# Patient Record
Sex: MALE | Race: WHITE
[De-identification: names, ages, dates, MRNs, and addresses within clinical notes are randomized per-mention and may not be internally consistent; named-entity substitution may affect disease eponyms.]

---

## 2019-03-04 ENCOUNTER — HOSPITAL ENCOUNTER (INPATIENT)
Dept: HOSPITAL 96 - M.ERS | Age: 64
LOS: 1 days | Discharge: HOME | DRG: 700 | End: 2019-03-05
Attending: HOSPITALIST | Admitting: HOSPITALIST
Payer: COMMERCIAL

## 2019-03-04 VITALS — SYSTOLIC BLOOD PRESSURE: 191 MMHG | DIASTOLIC BLOOD PRESSURE: 98 MMHG

## 2019-03-04 VITALS — BODY MASS INDEX: 33.18 KG/M2 | WEIGHT: 224 LBS | HEIGHT: 69.02 IN

## 2019-03-04 VITALS — DIASTOLIC BLOOD PRESSURE: 104 MMHG | SYSTOLIC BLOOD PRESSURE: 186 MMHG

## 2019-03-04 VITALS — DIASTOLIC BLOOD PRESSURE: 86 MMHG | SYSTOLIC BLOOD PRESSURE: 161 MMHG

## 2019-03-04 DIAGNOSIS — Z90.49: ICD-10-CM

## 2019-03-04 DIAGNOSIS — F10.10: ICD-10-CM

## 2019-03-04 DIAGNOSIS — N40.0: ICD-10-CM

## 2019-03-04 DIAGNOSIS — Z85.46: ICD-10-CM

## 2019-03-04 DIAGNOSIS — N18.2: ICD-10-CM

## 2019-03-04 DIAGNOSIS — N28.89: Primary | ICD-10-CM

## 2019-03-04 DIAGNOSIS — Z82.49: ICD-10-CM

## 2019-03-04 DIAGNOSIS — I12.9: ICD-10-CM

## 2019-03-04 DIAGNOSIS — Z79.899: ICD-10-CM

## 2019-03-04 DIAGNOSIS — K74.60: ICD-10-CM

## 2019-03-04 LAB
ABSOLUTE BASOPHILS: 0.1 THOU/UL (ref 0–0.2)
ABSOLUTE EOSINOPHILS: 0.1 THOU/UL (ref 0–0.7)
ABSOLUTE MONOCYTES: 1.3 THOU/UL (ref 0–1.2)
ALBUMIN SERPL-MCNC: 3.4 G/DL (ref 3.4–5)
ALP SERPL-CCNC: 133 U/L (ref 46–116)
ALT SERPL-CCNC: 26 U/L (ref 30–65)
ANION GAP SERPL CALC-SCNC: 13 MMOL/L (ref 7–16)
APTT BLD: 20.5 SECONDS (ref 25–31.3)
AST SERPL-CCNC: 54 U/L (ref 15–37)
BACTERIA-REFLEX: (no result) /HPF
BASOPHILS NFR BLD AUTO: 0.7 %
BILIRUB SERPL-MCNC: 1.2 MG/DL
BILIRUB UR-MCNC: NEGATIVE MG/DL
BUN SERPL-MCNC: 15 MG/DL (ref 7–18)
CALCIUM SERPL-MCNC: 11.1 MG/DL (ref 8.5–10.1)
CHLORIDE SERPL-SCNC: 105 MMOL/L (ref 98–107)
CK-MB MASS: 0.9 NG/ML
CO2 SERPL-SCNC: 23 MMOL/L (ref 21–32)
COLOR UR: YELLOW
CREAT SERPL-MCNC: 1 MG/DL (ref 0.6–1.3)
EOSINOPHIL NFR BLD: 0.8 %
GLUCOSE SERPL-MCNC: 104 MG/DL (ref 70–99)
GRANULOCYTES NFR BLD MANUAL: 64.5 %
HCT VFR BLD CALC: 58.1 % (ref 42–52)
HGB BLD-MCNC: 19.6 GM/DL (ref 14–18)
INR PPP: 1.2
KETONES UR STRIP-MCNC: NEGATIVE MG/DL
LIPASE: 383 U/L (ref 73–393)
LYMPHOCYTES # BLD: 2.8 THOU/UL (ref 0.8–5.3)
LYMPHOCYTES NFR BLD AUTO: 23 %
MAGNESIUM SERPL-MCNC: 1.8 MG/DL (ref 1.8–2.4)
MCH RBC QN AUTO: 30.5 PG (ref 26–34)
MCHC RBC AUTO-ENTMCNC: 33.8 G/DL (ref 28–37)
MCV RBC: 90.1 FL (ref 80–100)
MONOCYTES NFR BLD: 11 %
MPV: 7.3 FL. (ref 7.2–11.1)
NEUTROPHILS # BLD: 7.8 THOU/UL (ref 1.6–8.1)
NT-PRO BRAIN NAT PEPTIDE: 681 PG/ML (ref ?–300)
NUCLEATED RBCS: 0 /100WBC
PLATELET COUNT*: 237 THOU/UL (ref 150–400)
POTASSIUM SERPL-SCNC: 3.7 MMOL/L (ref 3.5–5.1)
PROT SERPL-MCNC: 7.5 G/DL (ref 6.4–8.2)
PROT UR QL STRIP: NEGATIVE
PROTHROMBIN TIME: 12.4 SECONDS (ref 9.2–11.5)
RBC # BLD AUTO: 6.44 MIL/UL (ref 4.5–6)
RBC # UR STRIP: NEGATIVE /UL
RDW-CV: 15.9 % (ref 10.5–14.5)
SODIUM SERPL-SCNC: 141 MMOL/L (ref 136–145)
SP GR UR STRIP: 1.01 (ref 1–1.03)
SQUAMOUS: (no result) /LPF (ref 0–3)
THC: POSITIVE
TROPONIN-I LEVEL: <0.06 NG/ML (ref ?–0.06)
URINE CLARITY: CLEAR
URINE GLUCOSE-RANDOM: NEGATIVE
URINE LEUKOCYTES-REFLEX: (no result)
URINE NITRITE-REFLEX: NEGATIVE
URINE WBC-REFLEX: (no result) /HPF (ref 0–5)
UROBILINOGEN UR STRIP-ACNC: 0.2 E.U./DL (ref 0.2–1)
WBC # BLD AUTO: 12.1 THOU/UL (ref 4–11)

## 2019-03-04 NOTE — CON
[address]  [address]                    CONSULTATION                  
_______________________________________________________________________________
 
Name:       GEOVANNA ROMERO                   Room:           [address] [place]  
.R.#:  [id number]      Account #:      [id number]  
Admission:  19     Attend Phys:    Celia Pepper MD    
Discharge:  19     Date of Birth:  [birth year]  
         Report #: [id number]
                                                                     [id number]  
_______________________________________________________________________________
THIS REPORT FOR:  //name//                      
 
CC: Brian Pepper
 
TYPE OF REPORT:  Cardiology consultation. 
 
INDICATION:  Elevated troponin.
 
HISTORY OF PRESENT ILLNESS:  The patient is a 63-year-old gentleman who was
admitted with left flank pain.  He has a history of prostate cancer, hepatic
cirrhosis, renal mass and alcohol abuse.  During his hospitalization, troponins
were checked.  The initial troponin was less than 0.06, then 0.16, then 0.38,
then 0.38 and less than 0.06, then 0.40 and then 0.26.  Throughout this, he
denied any chest pain.  He has no cardiac history.  Cardiac evaluation
consisting of noninvasive evaluation was deferred by the patient.  A 12-lead EKG
showed sinus rhythm without acute ST or T-wave abnormalities.
 
PAST MEDICAL HISTORY:
1.  Prostate cancer.
2.  Left renal mass.
3.  History of hypertension.
4.  History of cirrhosis.
5.  History of colectomy.
6.  Alcohol abuse.
 
HOME MEDICATIONS:  Quinapril 40 mg daily, Flomax 0.4 mg daily, tramadol 50 mg q.
6 hours p.r.n. and Xifaxan 550 mg daily.
 
ALLERGIES:  None documented.
 
FAMILY HISTORY:  The patient's mother had heart disease in her late 50s or early
60s.  The patient's father  at young age, his relatives did not have heart
disease.
 
SOCIAL HISTORY:  The patient is a lifelong nonsmoker, drinks alcohol daily.  He
is .  He lives with his wife.
 
REVIEW OF SYSTEMS:  Positive for shortness of breath and cough.  Otherwise,
unremarkable.
 
PHYSICAL EXAMINATION:
VITAL SIGNS:  Stable.  Blood pressure 104/63 and pulse 88 and regular.
GENERAL:  This is a pleasant gentleman who is in no distress.
HEENT:  The patient is wearing glasses.  Extraocular muscles intact.  Mucous
membranes moist.
 
 
 
[place] [address]                    CONSULTATION                  
_______________________________________________________________________________
 
Name:       GEOVANNA ROMERO                   Room:           M.227-1    DIS [place]  
M.R.#:  [id number]      Account #:      [id number]  
Admission:  19     Attend Phys:    Celia Pepper MD    
Discharge:  19     Date of Birth:  [birth year]  
         Report #: [id number]
                                                                     [id number]  
_______________________________________________________________________________
NECK:  Shows no jugular venous distention.  There are no carotid bruits.
CHEST:  Reveals clear lung fields.
CARDIOVASCULAR:  Reveals a regular rhythm.  Normal S1 and S2.  I do not
appreciate gallop or murmur.
ABDOMEN:  Reveals normal bowel sounds.  The abdomen is soft and nontender.
EXTREMITIES:  Shows no edema.  Peripheral pulses 2+ and palpable.
SKIN:  Warm and dry.
 
IMPRESSION AND RECOMMENDATIONS:
1.  Elevated troponins, likely a chronic issue.  There is no rhyme or reason to
the progression of his troponins during this hospitalization.  He is not having
any symptoms to suggest acute coronary syndrome.  I would like to follow up with
the patient as an outpatient in 2 weeks to see whether or not further stress
testing is warranted.
2.  Hypertension, presently stable.
3.  Prostate cancer and renal mass being followed by outside Oncology.
 
At this point in time, I believe the patient could be safely discharged home to
follow up as an outpatient.
 
 
 
 
 
 
 
 
 
 
 
 
 
 
 
 
 
 
 
 
 
 
 
 
 
                       
                                        By:                                
                 
D: 19 1446_______________________________________
T: 19 0840Lalo Vaughan MD, FACC      /nt

## 2019-03-05 VITALS — SYSTOLIC BLOOD PRESSURE: 132 MMHG | DIASTOLIC BLOOD PRESSURE: 84 MMHG

## 2019-03-05 VITALS — SYSTOLIC BLOOD PRESSURE: 115 MMHG | DIASTOLIC BLOOD PRESSURE: 59 MMHG

## 2019-03-05 VITALS — DIASTOLIC BLOOD PRESSURE: 63 MMHG | SYSTOLIC BLOOD PRESSURE: 104 MMHG

## 2019-03-05 VITALS — SYSTOLIC BLOOD PRESSURE: 156 MMHG | DIASTOLIC BLOOD PRESSURE: 84 MMHG

## 2019-03-05 LAB
ABSOLUTE BASOPHILS: 0.1 THOU/UL (ref 0–0.2)
ABSOLUTE EOSINOPHILS: 0 THOU/UL (ref 0–0.7)
ABSOLUTE MONOCYTES: 1.6 THOU/UL (ref 0–1.2)
AMMONIA PLAS-SCNC: 30 UMOL/L (ref 11–32)
ANION GAP SERPL CALC-SCNC: 14 MMOL/L (ref 7–16)
BASOPHILS NFR BLD AUTO: 0.3 %
BUN SERPL-MCNC: 13 MG/DL (ref 7–18)
CALCIUM SERPL-MCNC: 9.6 MG/DL (ref 8.5–10.1)
CHLORIDE SERPL-SCNC: 103 MMOL/L (ref 98–107)
CO2 SERPL-SCNC: 21 MMOL/L (ref 21–32)
CREAT SERPL-MCNC: 0.7 MG/DL (ref 0.6–1.3)
EOSINOPHIL NFR BLD: 0.1 %
GLUCOSE SERPL-MCNC: 120 MG/DL (ref 70–99)
GRANULOCYTES NFR BLD MANUAL: 79.8 %
HCT VFR BLD CALC: 54.7 % (ref 42–52)
HGB BLD-MCNC: 18.4 GM/DL (ref 14–18)
LYMPHOCYTES # BLD: 1.7 THOU/UL (ref 0.8–5.3)
LYMPHOCYTES NFR BLD AUTO: 10 %
MAGNESIUM SERPL-MCNC: 1.5 MG/DL (ref 1.8–2.4)
MCH RBC QN AUTO: 30.4 PG (ref 26–34)
MCHC RBC AUTO-ENTMCNC: 33.6 G/DL (ref 28–37)
MCV RBC: 90.4 FL (ref 80–100)
MONOCYTES NFR BLD: 9.8 %
MPV: 7.4 FL. (ref 7.2–11.1)
NEUTROPHILS # BLD: 13.4 THOU/UL (ref 1.6–8.1)
NUCLEATED RBCS: 0 /100WBC
PHOSPHATE SERPL-MCNC: 3.8 MG/DL (ref 2.5–4.9)
PLATELET COUNT*: 221 THOU/UL (ref 150–400)
POTASSIUM SERPL-SCNC: 3.2 MMOL/L (ref 3.5–5.1)
RBC # BLD AUTO: 6.05 MIL/UL (ref 4.5–6)
RDW-CV: 15.8 % (ref 10.5–14.5)
SODIUM SERPL-SCNC: 138 MMOL/L (ref 136–145)
TROPONIN-I LEVEL: 0.38 NG/ML (ref ?–0.06)
WBC # BLD AUTO: 16.8 THOU/UL (ref 4–11)

## 2019-03-05 NOTE — EKG
[place] [address]
Phone:  [phone]                     ELECTROCARDIOGRAM REPORT      
_______________________________________________________________________________
 
Name:       GEOVANNA ROMERO                   Room:           [place] [address]    ADM [place]  
.R.#:  [id number]      Account #:      [id number]  
Admission:  19     Attend Phys:    Celia Pepper MD    
Discharge:               Date of Birth:  [birth year]  
         Report #: [id number]
    [id number]
_______________________________________________________________________________
THIS REPORT FOR:  //name//                      
 
                         [facility] ED
                                       
Test Date:    2019               Test Time:    17:43:27
Pat Name:     GEOVANNA ROMERO               Department:   
Patient ID:   [id number]            Room:         [facility]
Gender:                               Technician:   FLOYD
:          1955               Requested By: Kole Ybarra
Order Number: [id number] MD:   Taurus Garibay
                                 Measurements
Intervals                              Axis          
Rate:         82                       P:            36
ID:           [id number]                      QRS:          6
QRSD:         82                       T:            -4
QT:           341                                    
QTc:          399                                    
                           Interpretive Statements
Sinus rhythm
Paired ventricular premature complexes
Borderline repolarization abnormality
Compared to ECG 2015 17:02:42
Ventricular premature complex(es) now present
 
Electronically Signed On 3-5-2019 12:53:00 CST by Taurus Garibay
[email or web address]
 
 
 
 
 
 
 
 
 
 
 
 
 
 
 
 
 
  <ELECTRONICALLY SIGNED>
                                           By: Taurus Garibay MD, [facility]     
  19     1253
D: 19 1743   _____________________________________
T: 19 1743   Taurus Garibay MD, [facility]       /EPI

## 2019-03-05 NOTE — EKG
[place] [address]
Phone:  [phone]                     ELECTROCARDIOGRAM REPORT      
_______________________________________________________________________________
 
Name:       GEOVANNA ROMERO                   Room:           [place] [address]    ADM [place]  
.R.#:  [id number]      Account #:      [id number]  
Admission:  19     Attend Phys:    Celia Pepper MD    
Discharge:               Date of Birth:  [birth year]  
         Report #: [id number]
    [id number]
_______________________________________________________________________________
THIS REPORT FOR:  //name//                      
 
                          [facility]
                                       
Test Date:    2019               Test Time:    02:25:37
Pat Name:     GEOVANNA ROMERO               Department:   
Patient ID:   [id number]            Room:         [place] [address]
Gender:       M                        Technician:   
:          1955               Requested By: Celia Pepper
Order Number: [id number]    Reading MD:   Taurus Garibay
                                 Measurements
Intervals                              Axis          
Rate:         96                       P:            56
OR:           169                      QRS:          13
QRSD:         82                       T:            12
QT:           365                                    
QTc:          462                                    
                           Interpretive Statements
Sinus rhythm
Left atrial enlargement
Borderline low voltage, extremity leads
Probable anteroseptal infarct, old
Baseline wander in lead(s) V1,V2
Compared to ECG 2015 17:02:42
Atrial abnormality now present
Myocardial infarct finding now present
T-wave abnormality no longer present
 
Electronically Signed On 3-5-2019 12:56:08 CST by Taurus Garibay
[email or web address]
 
 
 
 
 
 
 
 
 
 
 
 
 
  <ELECTRONICALLY SIGNED>
                                           By: Taurus Garibay MD, [facility]     
  19     1256
D: 195   _____________________________________
T: 19 0225   Taurus Garibay MD, FAC       /EPI

## 2019-03-05 NOTE — NUR
UPON ATTEMPTING TO COMPLETE OT EVALUATION, PT STATES HE DOES NOT WANT NOR
REQUIRE OT SERVICES AS HE IS ABLE TO COMPLETE ADLS AND AMBULATION WITH NO
ASSIST. PER PT REQUEST DISCHARGE OT EVAL

## 2019-03-05 NOTE — 2DMMODE
[place] [address]
Phone:  [phone] 2 D/M-MODE ECHOCARDIOGRAM     
_______________________________________________________________________________
 
Name:         GEOVANNA ROMERO                  Room:          [facility]    [facility] [place] 
[facility]#:    [id number]     Account #:     [id number]  
Admission:    19    Attend Phys:   Celia Pepper MD 
Discharge:                Date of Birth: [birth year]  
Date of Service: 19 1633  Report #:      [id number]
        [id number]
_______________________________________________________________________________
THIS REPORT FOR:  //name//                      
 
 
--------------- APPROVED REPORT --------------
 
 
Study performed:  2019 13:56:00
 
EXAM: Comprehensive 2D, Doppler, and color-flow 
Echocardiogram 
Patient Location: In-Patient   
Room #:  [facility]     Status:  routine
 
      BSA:         2.17
HR: 83 bpm BP:          104/63 mmHg 
Rhythm: NSR     
 
Other Information 
Study Quality: Good
 
Indications
Acute MI 
Dyspnea 
Chest Pain
 
2D Dimensions
IVSd:  11.66 (7-11mm) LVOT Diam:  20.22 (18-24mm) 
LVDd:  49.98 mm  
PWd:  13.60 (7-11mm) Ascending Ao:  35.67 (22-36mm)
LVDs:  24.24 (25-40mm) 
Aortic Root:  32.28 mm 
 
Volumes
Left Atrial Volume (Systole) 
    LA ESV Index:  36.90 mL/m2
 
Aortic Valve
AoV Peak Ramin.:  1.52 m/s 
AO Peak Gr.:  9.19 mmHg  LVOT Max P.65 mmHg
AO Mean Gr.:  4.86 mmHg  LVOT Mean PG:  3.60 mmHg
    LVOT Max V:  1.29 m/s
AO V2 VTI:  26.31 cm  LVOT Mean V:  0.88 m/s
GILMER (VTI):  3.29 cm2  LVOT V1 VTI:  26.98 cm
 
Mitral Valve
    E/A Ratio:  1.04
 
 
[place] [address]
Phone:  [phone]                     2 D/M-MODE ECHOCARDIOGRAM     
_______________________________________________________________________________
 
Name:         GEOVANNA ROMERO                  Room:          [address] [place] 
.R.#:    [id number]     Account #:     [id number]  
Admission:    19    Attend Phys:   Celia Pepper MD 
Discharge:                Date of Birth: [birth year]  
Date of Service: 19 1633  Report #:      [id number]
        [id number]
_______________________________________________________________________________
    MV Decel. Time:  302.50 ms
MV E Max Ramin.:  0.64 m/s 
MV PHT:  87.73 ms  
MVA (PHT):  2.51 cm2  
 
TDI
E/Lateral E':  4.92 E/Medial E':  9.14
   Medial E' Ramin.:  0.07 m/s
   Lateral E' Ramin.:  0.13 m/s
 
Pulmonary Valve
PV Peak Ramin.:  1.03 m/s PV Peak Gr.:  4.27 mmHg
 
Left Ventricle
The left ventricle is normal size. There is normal LV segmental wall 
motion. There is normal left ventricular wall thickness. Left 
ventricular systolic function is normal. The left ventricular 
ejection fraction is within the normal range. LVEF is 65%. The left 
ventricular diastolic function is normal.
 
Right Ventricle
The right ventricle is normal size. The right ventricular systolic 
function is normal.
 
Atria
Left atrium is mildly dilated. The right atrium size is 
normal.
 
Aortic Valve
The aortic valve is normal in structure. No aortic regurgitation is 
present. There is no aortic valvular stenosis.
 
Mitral Valve
The mitral valve is normal in structure. There is no mitral valve 
regurgitation noted. No evidence of mitral valve stenosis.
 
Tricuspid Valve
The tricuspid valve is normal in structure. Trace tricuspid 
regurgitation. Unable to assess PA pressure.
 
Pulmonic Valve
The pulmonary valve is normal in structure. There is no pulmonic 
valvular regurgitation.
 
Great Vessels
The aortic root is normal in size. IVC is normal in size and 
 
 
[place] [address]
Phone:  [phone]                     2 D/M-MODE ECHOCARDIOGRAM     
_______________________________________________________________________________
 
Name:         GEOVANNA ROMERO                  Room:          [address] [place] 
[facility]#:    [id number]     Account #:     [id number]  
Admission:    19    Attend Phys:   Celia Pepper MD 
Discharge:                Date of Birth: [birth year]  
Date of Service: 19 1633  Report #:      [id number]
        [id number]
_______________________________________________________________________________
collapses >50% with inspiration.
 
Pericardium
There is no pericardial effusion.
 
<Conclusion>
The left ventricle is normal size.
There is normal left ventricular wall thickness.
Left ventricular systolic function is normal.
The left ventricular ejection fraction is within the normal 
range.
LVEF is 65%.
The right ventricle is normal size.
Left atrium is mildly dilated.
The aortic valve is normal in structure.
The mitral valve is normal in structure.
The tricuspid valve is normal in structure.
IVC is normal in size and collapses >50% with inspiration.
There is no pericardial effusion.
There is normal LV segmental wall motion.
 
 
 
 
 
 
 
 
 
 
 
 
 
 
 
 
 
 
 
 
 
 
 
 
  <ELECTRONICALLY SIGNED>
                                           By: Taurus Garibay MD, [facility]     
  19     1633
D: 19 1633   _____________________________________
T: 19 1633   Taurus Garibay MD, FACC       /INF

## 2019-03-05 NOTE — NUR
RECEIVED REPORT AND ASSUMED CARE AT 2105. PT TRANSPORTED FROM ED TO ROOM 227.
BP ELEVATED, PT REPORTS PAIN IN BACK. OTHERWISE VSS. PRN MEDICATION ADMIN PER
ORDERS FOR BP AND PAIN. PT ORIENTATED TO ROOM, CALL LIGHT, FALL POLICY.
ASSESSMENT COMPLETED AS CHARTED. ADMISSION COMPLETED BY NURSING. PT UP SBA ON
2LNC PRN. BED LOCKED IN LOWEST POSITION, CALL LIGHT WITHIN REACH

## 2020-05-16 ENCOUNTER — HOSPITAL ENCOUNTER (INPATIENT)
Dept: HOSPITAL 96 - M.ERS | Age: 65
LOS: 6 days | DRG: 870 | End: 2020-05-22
Attending: INTERNAL MEDICINE | Admitting: INTERNAL MEDICINE
Payer: COMMERCIAL

## 2020-05-16 VITALS — SYSTOLIC BLOOD PRESSURE: 85 MMHG | DIASTOLIC BLOOD PRESSURE: 39 MMHG

## 2020-05-16 VITALS — DIASTOLIC BLOOD PRESSURE: 40 MMHG | SYSTOLIC BLOOD PRESSURE: 99 MMHG

## 2020-05-16 VITALS — SYSTOLIC BLOOD PRESSURE: 92 MMHG | DIASTOLIC BLOOD PRESSURE: 34 MMHG

## 2020-05-16 VITALS — DIASTOLIC BLOOD PRESSURE: 40 MMHG | SYSTOLIC BLOOD PRESSURE: 87 MMHG

## 2020-05-16 VITALS — DIASTOLIC BLOOD PRESSURE: 36 MMHG | SYSTOLIC BLOOD PRESSURE: 98 MMHG

## 2020-05-16 VITALS — SYSTOLIC BLOOD PRESSURE: 102 MMHG | DIASTOLIC BLOOD PRESSURE: 37 MMHG

## 2020-05-16 VITALS — DIASTOLIC BLOOD PRESSURE: 40 MMHG | SYSTOLIC BLOOD PRESSURE: 109 MMHG

## 2020-05-16 VITALS — SYSTOLIC BLOOD PRESSURE: 96 MMHG | DIASTOLIC BLOOD PRESSURE: 42 MMHG

## 2020-05-16 VITALS — SYSTOLIC BLOOD PRESSURE: 98 MMHG | DIASTOLIC BLOOD PRESSURE: 42 MMHG

## 2020-05-16 VITALS — SYSTOLIC BLOOD PRESSURE: 94 MMHG | DIASTOLIC BLOOD PRESSURE: 40 MMHG

## 2020-05-16 VITALS — DIASTOLIC BLOOD PRESSURE: 36 MMHG | SYSTOLIC BLOOD PRESSURE: 89 MMHG

## 2020-05-16 VITALS — DIASTOLIC BLOOD PRESSURE: 43 MMHG | SYSTOLIC BLOOD PRESSURE: 92 MMHG

## 2020-05-16 VITALS — DIASTOLIC BLOOD PRESSURE: 59 MMHG | SYSTOLIC BLOOD PRESSURE: 114 MMHG

## 2020-05-16 VITALS — DIASTOLIC BLOOD PRESSURE: 67 MMHG | SYSTOLIC BLOOD PRESSURE: 105 MMHG

## 2020-05-16 VITALS — SYSTOLIC BLOOD PRESSURE: 120 MMHG | DIASTOLIC BLOOD PRESSURE: 59 MMHG

## 2020-05-16 VITALS — SYSTOLIC BLOOD PRESSURE: 121 MMHG | DIASTOLIC BLOOD PRESSURE: 53 MMHG

## 2020-05-16 VITALS — DIASTOLIC BLOOD PRESSURE: 54 MMHG | SYSTOLIC BLOOD PRESSURE: 151 MMHG

## 2020-05-16 VITALS — DIASTOLIC BLOOD PRESSURE: 33 MMHG | SYSTOLIC BLOOD PRESSURE: 94 MMHG

## 2020-05-16 VITALS — DIASTOLIC BLOOD PRESSURE: 36 MMHG | SYSTOLIC BLOOD PRESSURE: 100 MMHG

## 2020-05-16 VITALS — DIASTOLIC BLOOD PRESSURE: 40 MMHG | SYSTOLIC BLOOD PRESSURE: 107 MMHG

## 2020-05-16 VITALS — DIASTOLIC BLOOD PRESSURE: 37 MMHG | SYSTOLIC BLOOD PRESSURE: 97 MMHG

## 2020-05-16 VITALS — SYSTOLIC BLOOD PRESSURE: 105 MMHG | DIASTOLIC BLOOD PRESSURE: 48 MMHG

## 2020-05-16 VITALS — DIASTOLIC BLOOD PRESSURE: 56 MMHG | SYSTOLIC BLOOD PRESSURE: 149 MMHG

## 2020-05-16 VITALS — SYSTOLIC BLOOD PRESSURE: 93 MMHG | DIASTOLIC BLOOD PRESSURE: 36 MMHG

## 2020-05-16 VITALS — SYSTOLIC BLOOD PRESSURE: 111 MMHG | DIASTOLIC BLOOD PRESSURE: 48 MMHG

## 2020-05-16 VITALS — SYSTOLIC BLOOD PRESSURE: 95 MMHG | DIASTOLIC BLOOD PRESSURE: 47 MMHG

## 2020-05-16 VITALS — DIASTOLIC BLOOD PRESSURE: 55 MMHG | SYSTOLIC BLOOD PRESSURE: 122 MMHG

## 2020-05-16 VITALS — DIASTOLIC BLOOD PRESSURE: 44 MMHG | SYSTOLIC BLOOD PRESSURE: 107 MMHG

## 2020-05-16 VITALS — DIASTOLIC BLOOD PRESSURE: 33 MMHG | SYSTOLIC BLOOD PRESSURE: 93 MMHG

## 2020-05-16 VITALS — SYSTOLIC BLOOD PRESSURE: 90 MMHG | DIASTOLIC BLOOD PRESSURE: 34 MMHG

## 2020-05-16 VITALS — SYSTOLIC BLOOD PRESSURE: 93 MMHG | DIASTOLIC BLOOD PRESSURE: 41 MMHG

## 2020-05-16 VITALS — SYSTOLIC BLOOD PRESSURE: 89 MMHG | DIASTOLIC BLOOD PRESSURE: 41 MMHG

## 2020-05-16 VITALS — BODY MASS INDEX: 39.51 KG/M2 | HEIGHT: 70.98 IN | WEIGHT: 282.19 LBS

## 2020-05-16 VITALS — DIASTOLIC BLOOD PRESSURE: 34 MMHG | SYSTOLIC BLOOD PRESSURE: 90 MMHG

## 2020-05-16 VITALS — DIASTOLIC BLOOD PRESSURE: 71 MMHG | SYSTOLIC BLOOD PRESSURE: 136 MMHG

## 2020-05-16 VITALS — SYSTOLIC BLOOD PRESSURE: 139 MMHG | DIASTOLIC BLOOD PRESSURE: 52 MMHG

## 2020-05-16 VITALS — SYSTOLIC BLOOD PRESSURE: 128 MMHG | DIASTOLIC BLOOD PRESSURE: 65 MMHG

## 2020-05-16 VITALS — DIASTOLIC BLOOD PRESSURE: 45 MMHG | SYSTOLIC BLOOD PRESSURE: 105 MMHG

## 2020-05-16 VITALS — SYSTOLIC BLOOD PRESSURE: 105 MMHG | DIASTOLIC BLOOD PRESSURE: 27 MMHG

## 2020-05-16 VITALS — DIASTOLIC BLOOD PRESSURE: 33 MMHG | SYSTOLIC BLOOD PRESSURE: 95 MMHG

## 2020-05-16 VITALS — DIASTOLIC BLOOD PRESSURE: 47 MMHG | SYSTOLIC BLOOD PRESSURE: 115 MMHG

## 2020-05-16 VITALS — SYSTOLIC BLOOD PRESSURE: 88 MMHG | DIASTOLIC BLOOD PRESSURE: 31 MMHG

## 2020-05-16 VITALS — DIASTOLIC BLOOD PRESSURE: 63 MMHG | SYSTOLIC BLOOD PRESSURE: 137 MMHG

## 2020-05-16 VITALS — DIASTOLIC BLOOD PRESSURE: 63 MMHG | SYSTOLIC BLOOD PRESSURE: 144 MMHG

## 2020-05-16 VITALS — SYSTOLIC BLOOD PRESSURE: 99 MMHG | DIASTOLIC BLOOD PRESSURE: 45 MMHG

## 2020-05-16 VITALS — DIASTOLIC BLOOD PRESSURE: 33 MMHG | SYSTOLIC BLOOD PRESSURE: 99 MMHG

## 2020-05-16 VITALS — SYSTOLIC BLOOD PRESSURE: 111 MMHG | DIASTOLIC BLOOD PRESSURE: 58 MMHG

## 2020-05-16 VITALS — DIASTOLIC BLOOD PRESSURE: 40 MMHG | SYSTOLIC BLOOD PRESSURE: 95 MMHG

## 2020-05-16 VITALS — DIASTOLIC BLOOD PRESSURE: 47 MMHG | SYSTOLIC BLOOD PRESSURE: 97 MMHG

## 2020-05-16 VITALS — SYSTOLIC BLOOD PRESSURE: 100 MMHG | DIASTOLIC BLOOD PRESSURE: 56 MMHG

## 2020-05-16 VITALS — SYSTOLIC BLOOD PRESSURE: 97 MMHG | DIASTOLIC BLOOD PRESSURE: 43 MMHG

## 2020-05-16 VITALS — SYSTOLIC BLOOD PRESSURE: 95 MMHG | DIASTOLIC BLOOD PRESSURE: 31 MMHG

## 2020-05-16 VITALS — DIASTOLIC BLOOD PRESSURE: 41 MMHG | SYSTOLIC BLOOD PRESSURE: 88 MMHG

## 2020-05-16 VITALS — DIASTOLIC BLOOD PRESSURE: 32 MMHG | SYSTOLIC BLOOD PRESSURE: 90 MMHG

## 2020-05-16 VITALS — DIASTOLIC BLOOD PRESSURE: 57 MMHG | SYSTOLIC BLOOD PRESSURE: 90 MMHG

## 2020-05-16 VITALS — SYSTOLIC BLOOD PRESSURE: 139 MMHG | DIASTOLIC BLOOD PRESSURE: 62 MMHG

## 2020-05-16 VITALS — DIASTOLIC BLOOD PRESSURE: 47 MMHG | SYSTOLIC BLOOD PRESSURE: 116 MMHG

## 2020-05-16 VITALS — SYSTOLIC BLOOD PRESSURE: 112 MMHG | DIASTOLIC BLOOD PRESSURE: 64 MMHG

## 2020-05-16 VITALS — DIASTOLIC BLOOD PRESSURE: 71 MMHG | SYSTOLIC BLOOD PRESSURE: 104 MMHG

## 2020-05-16 VITALS — SYSTOLIC BLOOD PRESSURE: 96 MMHG | DIASTOLIC BLOOD PRESSURE: 39 MMHG

## 2020-05-16 VITALS — DIASTOLIC BLOOD PRESSURE: 42 MMHG | SYSTOLIC BLOOD PRESSURE: 84 MMHG

## 2020-05-16 VITALS — DIASTOLIC BLOOD PRESSURE: 44 MMHG | SYSTOLIC BLOOD PRESSURE: 88 MMHG

## 2020-05-16 VITALS — SYSTOLIC BLOOD PRESSURE: 98 MMHG | DIASTOLIC BLOOD PRESSURE: 41 MMHG

## 2020-05-16 VITALS — SYSTOLIC BLOOD PRESSURE: 89 MMHG | DIASTOLIC BLOOD PRESSURE: 40 MMHG

## 2020-05-16 VITALS — SYSTOLIC BLOOD PRESSURE: 97 MMHG | DIASTOLIC BLOOD PRESSURE: 56 MMHG

## 2020-05-16 VITALS — SYSTOLIC BLOOD PRESSURE: 79 MMHG | DIASTOLIC BLOOD PRESSURE: 52 MMHG

## 2020-05-16 VITALS — SYSTOLIC BLOOD PRESSURE: 102 MMHG | DIASTOLIC BLOOD PRESSURE: 47 MMHG

## 2020-05-16 VITALS — SYSTOLIC BLOOD PRESSURE: 97 MMHG | DIASTOLIC BLOOD PRESSURE: 49 MMHG

## 2020-05-16 VITALS — SYSTOLIC BLOOD PRESSURE: 85 MMHG | DIASTOLIC BLOOD PRESSURE: 37 MMHG

## 2020-05-16 VITALS — SYSTOLIC BLOOD PRESSURE: 104 MMHG | DIASTOLIC BLOOD PRESSURE: 39 MMHG

## 2020-05-16 VITALS — DIASTOLIC BLOOD PRESSURE: 39 MMHG | SYSTOLIC BLOOD PRESSURE: 86 MMHG

## 2020-05-16 VITALS — SYSTOLIC BLOOD PRESSURE: 118 MMHG | DIASTOLIC BLOOD PRESSURE: 55 MMHG

## 2020-05-16 VITALS — SYSTOLIC BLOOD PRESSURE: 110 MMHG | DIASTOLIC BLOOD PRESSURE: 65 MMHG

## 2020-05-16 VITALS — DIASTOLIC BLOOD PRESSURE: 54 MMHG | SYSTOLIC BLOOD PRESSURE: 99 MMHG

## 2020-05-16 VITALS — DIASTOLIC BLOOD PRESSURE: 44 MMHG | SYSTOLIC BLOOD PRESSURE: 94 MMHG

## 2020-05-16 VITALS — SYSTOLIC BLOOD PRESSURE: 144 MMHG | DIASTOLIC BLOOD PRESSURE: 58 MMHG

## 2020-05-16 VITALS — SYSTOLIC BLOOD PRESSURE: 93 MMHG | DIASTOLIC BLOOD PRESSURE: 42 MMHG

## 2020-05-16 VITALS — SYSTOLIC BLOOD PRESSURE: 89 MMHG | DIASTOLIC BLOOD PRESSURE: 36 MMHG

## 2020-05-16 VITALS — DIASTOLIC BLOOD PRESSURE: 34 MMHG | SYSTOLIC BLOOD PRESSURE: 55 MMHG

## 2020-05-16 VITALS — SYSTOLIC BLOOD PRESSURE: 140 MMHG | DIASTOLIC BLOOD PRESSURE: 55 MMHG

## 2020-05-16 VITALS — DIASTOLIC BLOOD PRESSURE: 40 MMHG | SYSTOLIC BLOOD PRESSURE: 98 MMHG

## 2020-05-16 VITALS — SYSTOLIC BLOOD PRESSURE: 102 MMHG | DIASTOLIC BLOOD PRESSURE: 38 MMHG

## 2020-05-16 VITALS — SYSTOLIC BLOOD PRESSURE: 90 MMHG | DIASTOLIC BLOOD PRESSURE: 38 MMHG

## 2020-05-16 VITALS — DIASTOLIC BLOOD PRESSURE: 65 MMHG | SYSTOLIC BLOOD PRESSURE: 101 MMHG

## 2020-05-16 VITALS — DIASTOLIC BLOOD PRESSURE: 46 MMHG | SYSTOLIC BLOOD PRESSURE: 108 MMHG

## 2020-05-16 VITALS — DIASTOLIC BLOOD PRESSURE: 35 MMHG | SYSTOLIC BLOOD PRESSURE: 95 MMHG

## 2020-05-16 VITALS — SYSTOLIC BLOOD PRESSURE: 94 MMHG | DIASTOLIC BLOOD PRESSURE: 46 MMHG

## 2020-05-16 VITALS — SYSTOLIC BLOOD PRESSURE: 95 MMHG | DIASTOLIC BLOOD PRESSURE: 40 MMHG

## 2020-05-16 VITALS — SYSTOLIC BLOOD PRESSURE: 89 MMHG | DIASTOLIC BLOOD PRESSURE: 37 MMHG

## 2020-05-16 VITALS — DIASTOLIC BLOOD PRESSURE: 40 MMHG | SYSTOLIC BLOOD PRESSURE: 96 MMHG

## 2020-05-16 DIAGNOSIS — D69.6: ICD-10-CM

## 2020-05-16 DIAGNOSIS — E87.1: ICD-10-CM

## 2020-05-16 DIAGNOSIS — I50.9: ICD-10-CM

## 2020-05-16 DIAGNOSIS — I21.3: ICD-10-CM

## 2020-05-16 DIAGNOSIS — G93.41: ICD-10-CM

## 2020-05-16 DIAGNOSIS — K72.00: ICD-10-CM

## 2020-05-16 DIAGNOSIS — K55.9: ICD-10-CM

## 2020-05-16 DIAGNOSIS — K21.9: ICD-10-CM

## 2020-05-16 DIAGNOSIS — N28.89: ICD-10-CM

## 2020-05-16 DIAGNOSIS — A41.2: Primary | ICD-10-CM

## 2020-05-16 DIAGNOSIS — R65.21: ICD-10-CM

## 2020-05-16 DIAGNOSIS — J93.9: ICD-10-CM

## 2020-05-16 DIAGNOSIS — Z79.82: ICD-10-CM

## 2020-05-16 DIAGNOSIS — Z85.46: ICD-10-CM

## 2020-05-16 DIAGNOSIS — F10.239: ICD-10-CM

## 2020-05-16 DIAGNOSIS — K74.60: ICD-10-CM
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
## 2020-05-16 DIAGNOSIS — K76.6: ICD-10-CM

## 2020-05-16 DIAGNOSIS — L89.309: ICD-10-CM

## 2020-05-16 DIAGNOSIS — K56.7: ICD-10-CM

## 2020-05-16 DIAGNOSIS — Z66: ICD-10-CM

## 2020-05-16 DIAGNOSIS — Z79.899: ICD-10-CM

## 2020-05-16 DIAGNOSIS — Z20.828: ICD-10-CM

## 2020-05-16 DIAGNOSIS — N30.00: ICD-10-CM

## 2020-05-16 DIAGNOSIS — N17.0: ICD-10-CM

## 2020-05-16 DIAGNOSIS — F12.90: ICD-10-CM

## 2020-05-16 DIAGNOSIS — I11.0: ICD-10-CM

## 2020-05-16 DIAGNOSIS — Z90.49: ICD-10-CM

## 2020-05-16 DIAGNOSIS — R16.1: ICD-10-CM

## 2020-05-16 DIAGNOSIS — J96.01: ICD-10-CM

## 2020-05-16 DIAGNOSIS — I48.20: ICD-10-CM

## 2020-05-16 DIAGNOSIS — J69.0: ICD-10-CM

## 2020-05-16 DIAGNOSIS — E83.51: ICD-10-CM

## 2020-05-16 DIAGNOSIS — I25.2: ICD-10-CM

## 2020-05-16 LAB
%HYPO/RBC NFR BLD AUTO: (no result) %
ABSOLUTE MONOCYTES: 1.6 THOU/UL (ref 0–1.2)
ALBUMIN SERPL-MCNC: 1.8 G/DL (ref 3.4–5)
ALBUMIN SERPL-MCNC: 2.4 G/DL (ref 3.4–5)
ALP SERPL-CCNC: 181 U/L (ref 46–116)
ALP SERPL-CCNC: 199 U/L (ref 46–116)
ALT SERPL-CCNC: 65 U/L (ref 30–65)
ALT SERPL-CCNC: 92 U/L (ref 30–65)
ANION GAP SERPL CALC-SCNC: 10 MMOL/L (ref 7–16)
ANION GAP SERPL CALC-SCNC: 11 MMOL/L (ref 7–16)
ANION GAP SERPL CALC-SCNC: 14 MMOL/L (ref 7–16)
ANION GAP SERPL CALC-SCNC: 15 MMOL/L (ref 7–16)
ANISOCYTOSIS BLD QL SMEAR: (no result)
APTT BLD: 30.9 SECONDS (ref 25–31.3)
AST SERPL-CCNC: 226 U/L (ref 15–37)
AST SERPL-CCNC: 333 U/L (ref 15–37)
BACTERIA-REFLEX: (no result) /HPF
BE: -5.2 MMOL/L
BE: -7.9 MMOL/L
BILIRUB SERPL-MCNC: 4.4 MG/DL
BILIRUB SERPL-MCNC: 4.8 MG/DL
BILIRUB UR-MCNC: (no result) MG/DL
BUN SERPL-MCNC: 39 MG/DL (ref 7–18)
BUN SERPL-MCNC: 39 MG/DL (ref 7–18)
BUN SERPL-MCNC: 44 MG/DL (ref 7–18)
BUN SERPL-MCNC: 48 MG/DL (ref 7–18)
CALCIUM SERPL-MCNC: 8 MG/DL (ref 8.5–10.1)
CALCIUM SERPL-MCNC: 8.1 MG/DL (ref 8.5–10.1)
CALCIUM SERPL-MCNC: 8.1 MG/DL (ref 8.5–10.1)
CALCIUM SERPL-MCNC: 9.6 MG/DL (ref 8.5–10.1)
CELLULAR CASTS: (no result) /LPF
CHLORIDE SERPL-SCNC: 104 MMOL/L (ref 98–107)
CHLORIDE SERPL-SCNC: 107 MMOL/L (ref 98–107)
CHLORIDE SERPL-SCNC: 110 MMOL/L (ref 98–107)
CHLORIDE SERPL-SCNC: 95 MMOL/L (ref 98–107)
CK-MB MASS: 20.5 NG/ML
CO2 SERPL-SCNC: 16 MMOL/L (ref 21–32)
CO2 SERPL-SCNC: 18 MMOL/L (ref 21–32)
CO2 SERPL-SCNC: 19 MMOL/L (ref 21–32)
CO2 SERPL-SCNC: 22 MMOL/L (ref 21–32)
COARSE GRAN CASTS #/AREA URNS LPF: (no result) /LPF
COLOR UR: (no result)
CREAT SERPL-MCNC: 1.5 MG/DL (ref 0.6–1.3)
CREAT SERPL-MCNC: 1.7 MG/DL (ref 0.6–1.3)
CREAT SERPL-MCNC: 1.7 MG/DL (ref 0.6–1.3)
CREAT SERPL-MCNC: 2 MG/DL (ref 0.6–1.3)
FINE GRAN CASTS #/AREA URNS LPF: (no result) /LPF
GLUCOSE SERPL-MCNC: 100 MG/DL (ref 70–99)
GLUCOSE SERPL-MCNC: 107 MG/DL (ref 70–99)
GLUCOSE SERPL-MCNC: 92 MG/DL (ref 70–99)
GLUCOSE SERPL-MCNC: 94 MG/DL (ref 70–99)
GRANULOCYTES NFR BLD MANUAL: 89 %
HCT VFR BLD CALC: 47.9 % (ref 42–52)
HCT VFR BLD CALC: 53 % (ref 42–52)
HCT VFR BLD CALC: 59.9 % (ref 42–52)
HGB BLD-MCNC: 16.3 GM/DL (ref 14–18)
HGB BLD-MCNC: 18.1 GM/DL (ref 14–18)
HGB BLD-MCNC: 20.6 GM/DL (ref 14–18)
HYALINE CASTS #/AREA URNS LPF: (no result) /LPF
INR PPP: 1.5
INR PPP: 1.6
IRON SERPL-MCNC: 32 UG/DL (ref 50–175)
KETONES UR STRIP-MCNC: (no result) MG/DL
LIPASE: 527 U/L (ref 73–393)
LYMPHOCYTES # BLD: 2 THOU/UL (ref 0.8–5.3)
LYMPHOCYTES NFR BLD AUTO: 5 %
MAGNESIUM SERPL-MCNC: 2.1 MG/DL (ref 1.8–2.4)
MAGNESIUM SERPL-MCNC: 2.1 MG/DL (ref 1.8–2.4)
MCH RBC QN AUTO: 29 PG (ref 26–34)
MCH RBC QN AUTO: 29.3 PG (ref 26–34)
MCH RBC QN AUTO: 29.4 PG (ref 26–34)
MCHC RBC AUTO-ENTMCNC: 34.1 G/DL (ref 28–37)
MCHC RBC AUTO-ENTMCNC: 34.2 G/DL (ref 28–37)
MCHC RBC AUTO-ENTMCNC: 34.3 G/DL (ref 28–37)
MCV RBC: 84.7 FL (ref 80–100)
MCV RBC: 85.3 FL (ref 80–100)
MCV RBC: 86.2 FL (ref 80–100)
MONOCYTES NFR BLD: 4 %
MPV: 10.1 FL. (ref 7.2–11.1)
MPV: 9.3 FL. (ref 7.2–11.1)
MPV: 9.4 FL. (ref 7.2–11.1)
MUCUS: (no result) STRN/LPF
NEUTROPHILS # BLD: 36.5 THOU/UL (ref 1.6–8.1)
NEUTS BAND NFR BLD: 2 %
NUCLEATED RBCS: 0 /100WBC
PCO2 BLD: 17.4 MMHG (ref 35–45)
PCO2 BLD: 25.1 MMHG (ref 35–45)
PLATELET # BLD EST: (no result) 10*3/UL
PLATELET COUNT*: 102 THOU/UL (ref 150–400)
PLATELET COUNT*: 64 THOU/UL (ref 150–400)
PLATELET COUNT*: 69 THOU/UL (ref 150–400)
PO2 BLD: 169.9 MMHG (ref 75–100)
PO2 BLD: 197.8 MMHG (ref 75–100)
POTASSIUM SERPL-SCNC: 3.2 MMOL/L (ref 3.5–5.1)
POTASSIUM SERPL-SCNC: 3.5 MMOL/L (ref 3.5–5.1)
POTASSIUM SERPL-SCNC: 3.6 MMOL/L (ref 3.5–5.1)
POTASSIUM SERPL-SCNC: 3.7 MMOL/L (ref 3.5–5.1)
PROT SERPL-MCNC: 5 G/DL (ref 6.4–8.2)
PROT SERPL-MCNC: 6.6 G/DL (ref 6.4–8.2)
PROT UR QL STRIP: (no result)
PROTHROMBIN TIME: 15.3 SECONDS (ref 9.2–11.5)
PROTHROMBIN TIME: 16.2 SECONDS (ref 9.2–11.5)
RBC # BLD AUTO: 5.56 MIL/UL (ref 4.5–6)
RBC # BLD AUTO: 6.25 MIL/UL (ref 4.5–6)
RBC # BLD AUTO: 7.02 MIL/UL (ref 4.5–6)
RBC # UR STRIP: (no result) /UL
RBC #/AREA URNS HPF: (no result) /HPF (ref 0–2)
RDW-CV: 15.3 % (ref 10.5–14.5)
RDW-CV: 15.7 % (ref 10.5–14.5)
RDW-CV: 15.9 % (ref 10.5–14.5)
SAO2 % BLD FROM PO2: 22 % (ref 20–39)
SODIUM SERPL-SCNC: 129 MMOL/L (ref 136–145)
SODIUM SERPL-SCNC: 134 MMOL/L (ref 136–145)
SODIUM SERPL-SCNC: 136 MMOL/L (ref 136–145)
SODIUM SERPL-SCNC: 142 MMOL/L (ref 136–145)
SP GR UR STRIP: 1.01 (ref 1–1.03)
SQUAMOUS: (no result) /LPF (ref 0–3)
THC: POSITIVE
TOXIC GRANULES BLD QL SMEAR: (no result)
URINE CLARITY: CLEAR
URINE GLUCOSE-RANDOM: NEGATIVE
URINE LEUKOCYTES-REFLEX: (no result)
URINE NITRITE-REFLEX: NEGATIVE
UROBILINOGEN UR STRIP-ACNC: 4 E.U./DL (ref 0.2–1)
WBC # BLD AUTO: 40.1 THOU/UL (ref 4–11)
WBC # BLD AUTO: 47.2 THOU/UL (ref 4–11)
WBC # BLD AUTO: 48 THOU/UL (ref 4–11)

## 2020-05-16 PROCEDURE — 0BH17EZ INSERTION OF ENDOTRACHEAL AIRWAY INTO TRACHEA, VIA NATURAL OR ARTIFICIAL OPENING: ICD-10-PCS | Performed by: INTERNAL MEDICINE

## 2020-05-16 PROCEDURE — 5A1955Z RESPIRATORY VENTILATION, GREATER THAN 96 CONSECUTIVE HOURS: ICD-10-PCS | Performed by: INTERNAL MEDICINE

## 2020-05-16 PROCEDURE — 02HV33Z INSERTION OF INFUSION DEVICE INTO SUPERIOR VENA CAVA, PERCUTANEOUS APPROACH: ICD-10-PCS

## 2020-05-16 PROCEDURE — B548ZZA ULTRASONOGRAPHY OF SUPERIOR VENA CAVA, GUIDANCE: ICD-10-PCS

## 2020-05-16 NOTE — EKG
[place] [address]
Phone:  [phone]                     ELECTROCARDIOGRAM REPORT      
_______________________________________________________________________________
 
Name:         GEOVANNA ROMERO                  Room:          [address]    ADM [place] 
.R.#:    [id number]     Account #:     [id number]  
Admission:    20    Attend Phys:   Trina Aranda, 
Discharge:                Date of Birth: [birth year]  
Date of Service: 20 0028  Report #:      [id number]
        [id number]
_______________________________________________________________________________
THIS REPORT FOR:  //name//                      
 
                         [facility] ED
                                       
Test Date:    2020               Test Time:    00:28:10
Pat Name:     GEOVANNA ROMERO               Department:   
Patient ID:   [id number]            Room:         [facility]
Gender:       M                        Technician:   DULCE
:          1955               Requested By: Dulce Fallon
Order Number: [id number] MD:   Tonny Winn
                                 Measurements
Intervals                              Axis          
Rate:         156                      P:            
CT:                                    QRS:          14
QRSD:         81                       T:            0
QT:           270                                    
QTc:          435                                    
                           Interpretive Statements
Atrial fibrillation
Low voltage, extremity leads
Anteroseptal infarct, old
Borderline ST elevation, lateral leads
Baseline wander in lead(s) V1
Compared to ECG 2019 02:25:37
ST (T wave) deviation now present
Sinus rhythm no longer present
Atrial abnormality no longer present
Myocardial infarct finding still present
Consider acute lateral MI
Electronically Signed On 2020 10:49:23 CDT by Tonny Winn
[email or web address]
 
 
 
 
 
 
 
 
 
 
 
 
 
  <ELECTRONICALLY SIGNED>
                                           By: SHARLENE Winn MD, [facility]    
  20     1049
D: 208   _____________________________________
T: 20 0028   SHARLENE Winn MD, [facility]      /EPI

## 2020-05-16 NOTE — CON
[address]  [address]                    CONSULTATION                  
_______________________________________________________________________________
 
Name:       GEOVANNA ROMERO                   Room:           [address] [place]  
.R.#:  [id number]      Account #:      [id number]  
Admission:  05/16/20     Attend Phys:    Trina Aranda MD 
Discharge:               Date of Birth:  [birth year]  
         Report #: [id number]
                                                                     [id number]  
_______________________________________________________________________________
THIS REPORT FOR:  //name//                      
 
cc:  Breana Cardoza Sarah Anne FNP                                                  ~
THIS REPORT FOR:  //name//                      
 
CC: Trina Cardoza
 
DATE OF SERVICE:  05/16/2020
 
 
I was asked to see this 64-year-old gentleman for acute respiratory failure,
septic shock.
 
HISTORY OF PRESENT ILLNESS:  The patient is currently on the ventilator and
sedated, he is not able to give me any information.  He is currently on
propofol, Rachid-Synephrine, IV fluid and Cardizem drip.  All of the information
was obtained from chart, nursing staff and attending physician.  He was brought
to the Emergency Room via EMS for altered mental status.  His wife stated that
he has had altered mental status for the past 2 days.  He fell.  He drinks
heavily.  He was intubated secondary to being tachypneic and for airway
protection earlier this morning.  He is currently on the ventilator.  He is
sedated.  He does not follow commands.
 
PAST MEDICAL HISTORY:  Colectomy, hypertension, prostate cancer, cirrhosis,
renal mass, which he was supposed to have a workup, but apparently he did not
have workup as an outpatient.
 
ALLERGIES:  No known drug allergies.
 
MEDICATIONS:  Currently, he is on propofol, Cardizem drip, IV fluid, vancomycin,
heparin drip, Zosyn.
 
SOCIAL HISTORY:  Positive for smoking and alcohol abuse, details are not known.
 
FAMILY HISTORY:  Hypertension per chart.
 
REVIEW OF SYSTEMS:  As mentioned as above.  I have discussed the patient with
RN, RT, and attending physician.  Other systems are otherwise negative.
 
PHYSICAL EXAMINATION:
GENERAL:  This is an overweight gentleman.
VITAL SIGNS:  His O2 saturation 100%, FiO2 is 100%, respiratory rate 30, heart
rate 104, blood pressure 95/38, heart rate 97, temperature 38.2.
HEENT:  Normocephalic, atraumatic.  Pupils equal, round, reactive to light.  He
 
 
 
[place] [id number]                    CONSULTATION                  
_______________________________________________________________________________
 
Name:       GEOVANNA ROMERO FE                   Room:           [address]#:  [id number]      Account #:      [id number]  
Admission:  05/16/20     Attend Phys:    Trina Aranda MD 
Discharge:               Date of Birth:  [birth year]  
         Report #: [id number]
                                                                     [id number]  
_______________________________________________________________________________
is orally intubated.  Nose is clear.
NECK:  No lymphadenopathy or thyromegaly.
CARDIOVASCULAR:  Irregularly irregular rhythm.
CHEST:  Inspection, he appears tachypneic.
LUNGS:  There are bibasilar crackles, dullness at the bases.
ABDOMEN:  Distended, but soft.  There is no mass.
EXTREMITIES:  There is edema.
LYMPHATICS:  There is no lymphadenopathy.
NEUROLOGIC:  He is on the ventilator, sedated.
 
LABORATORY DATA:  I reviewed the following lab data:  Chest x-ray shows ET tube
is in good position.  There is left mid lung and lower lobe infiltrate, right
lower lobe infiltrate.  CT of abdomen did show large left renal mass, left renal
interpolar region, suspected wedge-shaped infarction, cirrhosis.  CT of head did
not show any acute abnormalities.  CT of the chest was suboptimal study, but
there was no definite pulmonary embolism detected, right basilar
infiltrates/atelectasis, left apical nodule semi-solid 1.2 x 1.2 x 1.8 cm, right
upper lobe 6 mm nodule.  WBC 48, hemoglobin 18.1, platelet 64.  Sodium 134,
potassium 3.2, chloride 104, CO2 of 16, BUN 39, creatinine 1.5.  Lactic acid 4. 
Ferritin 1857, , ALT 92, alkaline phosphatase 181.  Ammonia less than 10.
 , troponin 12.12.  BNP 10,588, lipase 527.  His urine drug screen was
positive for marijuana.  INR 1.6.  D-dimer 17.3.
 
IMPRESSION:
1.  Acute respiratory failure, multifactorial in etiology including septic
shock, atrial fibrillation with rapid ventricular response, probably myocardial
infarction, congestive heart failure, versus others.
2.  Abnormal chest x-ray and CT of chest.
3.  Septic shock.
4.  Atrial fibrillation with rapid ventricular response.
5.  Elevated troponin? Myocardial infarction.
6.  Cirrhosis.
7.  Alcohol abuse.
8.  Encephalopathy.
9.  Acute kidney injury.
10.  Leukocytosis.
11.  COVID-19, patient under investigation.
 
PLAN AND RECOMMENDATIONS:
1.  Titrate FiO2 to keep O2 saturation 94%.
2.  Continue ventilator support until the patient is more stable.
3.  Ventilator setting and ABG reviewed.
4.  Agree with antibiotic and ID consultation.
5.  Agree with IV fluid.
6.  Cardiology is on the case.  The patient is started on heparin.
7.  Start Protonix for stress ulcer prophylaxis.
 
 
 
[address] [place]  [address]                    CONSULTATION                  
_______________________________________________________________________________
 
Name:       GEOVANNA ROMERO                   Room:           [address] [place]  
M.R.#:  [id number]      Account #:      [id number]  
Admission:  05/16/20     Attend Phys:    Trina Aranda MD 
Discharge:               Date of Birth:  [birth year]  
         Report #: [id number]
                                                                     [id number]  
_______________________________________________________________________________
8.  Follow up COVID-19 testing.
9.  I will start him on fentanyl drip and try to go down on propofol as he is
hypotensive.
10.  Continue Cardizem drip per Cardiology.
11.  The findings and recommendations were discussed with RN, RT and Dr. Aranda,
attending physician.
 
Thank you very much for allowing me to participate in care of this very nice
gentleman.  The patient is critically ill.  This is critical care time 32
minutes without overlap.
 
 
 
 
 
 
 
 
 
 
 
 
 
 
 
 
 
 
 
 
 
 
 
 
 
 
 
 
 
 
 
 
 
 
<ELECTRONICALLY SIGNED>
                                        By:  Victoria Loza MD             
05/16/20     1424
D: 05/16/20 1109_______________________________________
T: 05/16/20 1156Victoria Loza MD                /nt

## 2020-05-16 NOTE — EEG
[address]  [address]                    EEG STUDY REPORT              
_______________________________________________________________________________
 
Name:       GEOVANNA ROMERO                   Room:           [address] [place]  
[facility]#:  [id number]      Account #:      [id number]  
Admission:  05/16/20     Attend Phys:    Trina Aranda MD 
Discharge:               Date of Birth:  [birth year]  
         Report #: [id number]
                                                                     [id number]  
_______________________________________________________________________________
THIS REPORT FOR:  //name//                      
 
CC: Trina Aranda
    Breana Nani
 
DATE OF SERVICE:  05/19/2020
 
 
This patient is being evaluated for altered mental status.  EEG was done by
placing the electrode by standard 10-20 system of electrode placement.  Both
referential and sequential montages were used for recording.  Background
activity in this patient's EEG is markedly suppressed.  It is very low voltage. 
Difficult to determine the background activity.  It does appear to be about 5 Hz
and less than 10 microvolt.  Photic stimulation is unremarkable.  No active
epileptiform activity was noticed.
 
IMPRESSION:  This is a severely abnormal EEG consistent with a diagnosis of
encephalopathy.  Clinical correlation is recommended.
 
 
 
 
 
 
 
 
 
 
 
 
 
 
 
 
 
 
 
 
 
 
 
 
 
 
                       
                                        By:                                
                 
D: 05/20/20 1513_______________________________________
T: 05/20/20 1540Bony Holbrook MD            /nt

## 2020-05-17 VITALS — SYSTOLIC BLOOD PRESSURE: 101 MMHG | DIASTOLIC BLOOD PRESSURE: 39 MMHG

## 2020-05-17 VITALS — DIASTOLIC BLOOD PRESSURE: 38 MMHG | SYSTOLIC BLOOD PRESSURE: 107 MMHG

## 2020-05-17 VITALS — SYSTOLIC BLOOD PRESSURE: 92 MMHG | DIASTOLIC BLOOD PRESSURE: 38 MMHG

## 2020-05-17 VITALS — SYSTOLIC BLOOD PRESSURE: 102 MMHG | DIASTOLIC BLOOD PRESSURE: 47 MMHG

## 2020-05-17 VITALS — SYSTOLIC BLOOD PRESSURE: 102 MMHG | DIASTOLIC BLOOD PRESSURE: 40 MMHG

## 2020-05-17 VITALS — DIASTOLIC BLOOD PRESSURE: 34 MMHG | SYSTOLIC BLOOD PRESSURE: 96 MMHG

## 2020-05-17 VITALS — SYSTOLIC BLOOD PRESSURE: 103 MMHG | DIASTOLIC BLOOD PRESSURE: 47 MMHG

## 2020-05-17 VITALS — DIASTOLIC BLOOD PRESSURE: 43 MMHG | SYSTOLIC BLOOD PRESSURE: 100 MMHG

## 2020-05-17 VITALS — SYSTOLIC BLOOD PRESSURE: 103 MMHG | DIASTOLIC BLOOD PRESSURE: 32 MMHG

## 2020-05-17 VITALS — DIASTOLIC BLOOD PRESSURE: 39 MMHG | SYSTOLIC BLOOD PRESSURE: 98 MMHG

## 2020-05-17 VITALS — SYSTOLIC BLOOD PRESSURE: 109 MMHG | DIASTOLIC BLOOD PRESSURE: 37 MMHG

## 2020-05-17 VITALS — DIASTOLIC BLOOD PRESSURE: 41 MMHG | SYSTOLIC BLOOD PRESSURE: 101 MMHG

## 2020-05-17 VITALS — DIASTOLIC BLOOD PRESSURE: 32 MMHG | SYSTOLIC BLOOD PRESSURE: 102 MMHG

## 2020-05-17 VITALS — DIASTOLIC BLOOD PRESSURE: 33 MMHG | SYSTOLIC BLOOD PRESSURE: 91 MMHG

## 2020-05-17 VITALS — DIASTOLIC BLOOD PRESSURE: 40 MMHG | SYSTOLIC BLOOD PRESSURE: 94 MMHG

## 2020-05-17 VITALS — SYSTOLIC BLOOD PRESSURE: 92 MMHG | DIASTOLIC BLOOD PRESSURE: 36 MMHG

## 2020-05-17 VITALS — SYSTOLIC BLOOD PRESSURE: 91 MMHG | DIASTOLIC BLOOD PRESSURE: 40 MMHG

## 2020-05-17 VITALS — SYSTOLIC BLOOD PRESSURE: 121 MMHG | DIASTOLIC BLOOD PRESSURE: 34 MMHG

## 2020-05-17 VITALS — SYSTOLIC BLOOD PRESSURE: 104 MMHG | DIASTOLIC BLOOD PRESSURE: 49 MMHG

## 2020-05-17 VITALS — SYSTOLIC BLOOD PRESSURE: 113 MMHG | DIASTOLIC BLOOD PRESSURE: 49 MMHG

## 2020-05-17 VITALS — DIASTOLIC BLOOD PRESSURE: 41 MMHG | SYSTOLIC BLOOD PRESSURE: 105 MMHG

## 2020-05-17 VITALS — SYSTOLIC BLOOD PRESSURE: 104 MMHG | DIASTOLIC BLOOD PRESSURE: 38 MMHG

## 2020-05-17 VITALS — DIASTOLIC BLOOD PRESSURE: 48 MMHG | SYSTOLIC BLOOD PRESSURE: 119 MMHG

## 2020-05-17 VITALS — SYSTOLIC BLOOD PRESSURE: 82 MMHG | DIASTOLIC BLOOD PRESSURE: 37 MMHG

## 2020-05-17 VITALS — SYSTOLIC BLOOD PRESSURE: 108 MMHG | DIASTOLIC BLOOD PRESSURE: 37 MMHG

## 2020-05-17 VITALS — DIASTOLIC BLOOD PRESSURE: 29 MMHG | SYSTOLIC BLOOD PRESSURE: 97 MMHG

## 2020-05-17 VITALS — SYSTOLIC BLOOD PRESSURE: 89 MMHG | DIASTOLIC BLOOD PRESSURE: 35 MMHG

## 2020-05-17 VITALS — SYSTOLIC BLOOD PRESSURE: 85 MMHG | DIASTOLIC BLOOD PRESSURE: 46 MMHG

## 2020-05-17 VITALS — DIASTOLIC BLOOD PRESSURE: 31 MMHG | SYSTOLIC BLOOD PRESSURE: 98 MMHG

## 2020-05-17 VITALS — DIASTOLIC BLOOD PRESSURE: 37 MMHG | SYSTOLIC BLOOD PRESSURE: 90 MMHG

## 2020-05-17 VITALS — DIASTOLIC BLOOD PRESSURE: 40 MMHG | SYSTOLIC BLOOD PRESSURE: 87 MMHG

## 2020-05-17 VITALS — DIASTOLIC BLOOD PRESSURE: 33 MMHG | SYSTOLIC BLOOD PRESSURE: 111 MMHG

## 2020-05-17 VITALS — DIASTOLIC BLOOD PRESSURE: 43 MMHG | SYSTOLIC BLOOD PRESSURE: 118 MMHG

## 2020-05-17 VITALS — DIASTOLIC BLOOD PRESSURE: 36 MMHG | SYSTOLIC BLOOD PRESSURE: 60 MMHG

## 2020-05-17 VITALS — DIASTOLIC BLOOD PRESSURE: 35 MMHG | SYSTOLIC BLOOD PRESSURE: 91 MMHG

## 2020-05-17 VITALS — SYSTOLIC BLOOD PRESSURE: 90 MMHG | DIASTOLIC BLOOD PRESSURE: 45 MMHG

## 2020-05-17 VITALS — DIASTOLIC BLOOD PRESSURE: 48 MMHG | SYSTOLIC BLOOD PRESSURE: 111 MMHG

## 2020-05-17 VITALS — DIASTOLIC BLOOD PRESSURE: 42 MMHG | SYSTOLIC BLOOD PRESSURE: 108 MMHG

## 2020-05-17 VITALS — DIASTOLIC BLOOD PRESSURE: 36 MMHG | SYSTOLIC BLOOD PRESSURE: 96 MMHG

## 2020-05-17 VITALS — SYSTOLIC BLOOD PRESSURE: 91 MMHG | DIASTOLIC BLOOD PRESSURE: 33 MMHG

## 2020-05-17 VITALS — SYSTOLIC BLOOD PRESSURE: 101 MMHG | DIASTOLIC BLOOD PRESSURE: 42 MMHG

## 2020-05-17 VITALS — DIASTOLIC BLOOD PRESSURE: 45 MMHG | SYSTOLIC BLOOD PRESSURE: 81 MMHG

## 2020-05-17 VITALS — DIASTOLIC BLOOD PRESSURE: 37 MMHG | SYSTOLIC BLOOD PRESSURE: 83 MMHG

## 2020-05-17 VITALS — SYSTOLIC BLOOD PRESSURE: 104 MMHG | DIASTOLIC BLOOD PRESSURE: 44 MMHG

## 2020-05-17 VITALS — DIASTOLIC BLOOD PRESSURE: 69 MMHG | SYSTOLIC BLOOD PRESSURE: 119 MMHG

## 2020-05-17 VITALS — DIASTOLIC BLOOD PRESSURE: 32 MMHG | SYSTOLIC BLOOD PRESSURE: 95 MMHG

## 2020-05-17 VITALS — SYSTOLIC BLOOD PRESSURE: 84 MMHG | DIASTOLIC BLOOD PRESSURE: 31 MMHG

## 2020-05-17 VITALS — SYSTOLIC BLOOD PRESSURE: 100 MMHG | DIASTOLIC BLOOD PRESSURE: 39 MMHG

## 2020-05-17 VITALS — DIASTOLIC BLOOD PRESSURE: 33 MMHG | SYSTOLIC BLOOD PRESSURE: 103 MMHG

## 2020-05-17 VITALS — DIASTOLIC BLOOD PRESSURE: 36 MMHG | SYSTOLIC BLOOD PRESSURE: 89 MMHG

## 2020-05-17 VITALS — DIASTOLIC BLOOD PRESSURE: 34 MMHG | SYSTOLIC BLOOD PRESSURE: 107 MMHG

## 2020-05-17 VITALS — DIASTOLIC BLOOD PRESSURE: 48 MMHG | SYSTOLIC BLOOD PRESSURE: 99 MMHG

## 2020-05-17 VITALS — DIASTOLIC BLOOD PRESSURE: 40 MMHG | SYSTOLIC BLOOD PRESSURE: 100 MMHG

## 2020-05-17 VITALS — SYSTOLIC BLOOD PRESSURE: 113 MMHG | DIASTOLIC BLOOD PRESSURE: 41 MMHG

## 2020-05-17 VITALS — DIASTOLIC BLOOD PRESSURE: 40 MMHG | SYSTOLIC BLOOD PRESSURE: 105 MMHG

## 2020-05-17 VITALS — DIASTOLIC BLOOD PRESSURE: 32 MMHG | SYSTOLIC BLOOD PRESSURE: 96 MMHG

## 2020-05-17 VITALS — DIASTOLIC BLOOD PRESSURE: 37 MMHG | SYSTOLIC BLOOD PRESSURE: 103 MMHG

## 2020-05-17 VITALS — SYSTOLIC BLOOD PRESSURE: 99 MMHG | DIASTOLIC BLOOD PRESSURE: 34 MMHG

## 2020-05-17 VITALS — SYSTOLIC BLOOD PRESSURE: 93 MMHG | DIASTOLIC BLOOD PRESSURE: 40 MMHG

## 2020-05-17 VITALS — DIASTOLIC BLOOD PRESSURE: 41 MMHG | SYSTOLIC BLOOD PRESSURE: 75 MMHG

## 2020-05-17 VITALS — DIASTOLIC BLOOD PRESSURE: 34 MMHG | SYSTOLIC BLOOD PRESSURE: 103 MMHG

## 2020-05-17 VITALS — DIASTOLIC BLOOD PRESSURE: 36 MMHG | SYSTOLIC BLOOD PRESSURE: 91 MMHG

## 2020-05-17 VITALS — DIASTOLIC BLOOD PRESSURE: 38 MMHG | SYSTOLIC BLOOD PRESSURE: 98 MMHG

## 2020-05-17 VITALS — SYSTOLIC BLOOD PRESSURE: 111 MMHG | DIASTOLIC BLOOD PRESSURE: 39 MMHG

## 2020-05-17 VITALS — DIASTOLIC BLOOD PRESSURE: 73 MMHG | SYSTOLIC BLOOD PRESSURE: 100 MMHG

## 2020-05-17 VITALS — DIASTOLIC BLOOD PRESSURE: 31 MMHG | SYSTOLIC BLOOD PRESSURE: 105 MMHG

## 2020-05-17 VITALS — DIASTOLIC BLOOD PRESSURE: 34 MMHG | SYSTOLIC BLOOD PRESSURE: 104 MMHG

## 2020-05-17 VITALS — DIASTOLIC BLOOD PRESSURE: 34 MMHG | SYSTOLIC BLOOD PRESSURE: 91 MMHG

## 2020-05-17 VITALS — SYSTOLIC BLOOD PRESSURE: 100 MMHG | DIASTOLIC BLOOD PRESSURE: 36 MMHG

## 2020-05-17 VITALS — DIASTOLIC BLOOD PRESSURE: 47 MMHG | SYSTOLIC BLOOD PRESSURE: 119 MMHG

## 2020-05-17 VITALS — SYSTOLIC BLOOD PRESSURE: 87 MMHG | DIASTOLIC BLOOD PRESSURE: 35 MMHG

## 2020-05-17 VITALS — SYSTOLIC BLOOD PRESSURE: 103 MMHG | DIASTOLIC BLOOD PRESSURE: 33 MMHG

## 2020-05-17 VITALS — DIASTOLIC BLOOD PRESSURE: 43 MMHG | SYSTOLIC BLOOD PRESSURE: 114 MMHG

## 2020-05-17 VITALS — SYSTOLIC BLOOD PRESSURE: 101 MMHG | DIASTOLIC BLOOD PRESSURE: 47 MMHG

## 2020-05-17 VITALS — SYSTOLIC BLOOD PRESSURE: 96 MMHG | DIASTOLIC BLOOD PRESSURE: 30 MMHG

## 2020-05-17 VITALS — SYSTOLIC BLOOD PRESSURE: 99 MMHG | DIASTOLIC BLOOD PRESSURE: 35 MMHG

## 2020-05-17 VITALS — DIASTOLIC BLOOD PRESSURE: 34 MMHG | SYSTOLIC BLOOD PRESSURE: 102 MMHG

## 2020-05-17 VITALS — SYSTOLIC BLOOD PRESSURE: 100 MMHG | DIASTOLIC BLOOD PRESSURE: 38 MMHG

## 2020-05-17 VITALS — SYSTOLIC BLOOD PRESSURE: 96 MMHG | DIASTOLIC BLOOD PRESSURE: 41 MMHG

## 2020-05-17 VITALS — DIASTOLIC BLOOD PRESSURE: 27 MMHG | SYSTOLIC BLOOD PRESSURE: 99 MMHG

## 2020-05-17 VITALS — SYSTOLIC BLOOD PRESSURE: 106 MMHG | DIASTOLIC BLOOD PRESSURE: 40 MMHG

## 2020-05-17 VITALS — SYSTOLIC BLOOD PRESSURE: 103 MMHG | DIASTOLIC BLOOD PRESSURE: 37 MMHG

## 2020-05-17 VITALS — DIASTOLIC BLOOD PRESSURE: 43 MMHG | SYSTOLIC BLOOD PRESSURE: 94 MMHG

## 2020-05-17 VITALS — SYSTOLIC BLOOD PRESSURE: 104 MMHG | DIASTOLIC BLOOD PRESSURE: 37 MMHG

## 2020-05-17 VITALS — SYSTOLIC BLOOD PRESSURE: 95 MMHG | DIASTOLIC BLOOD PRESSURE: 39 MMHG

## 2020-05-17 VITALS — SYSTOLIC BLOOD PRESSURE: 98 MMHG | DIASTOLIC BLOOD PRESSURE: 47 MMHG

## 2020-05-17 VITALS — SYSTOLIC BLOOD PRESSURE: 109 MMHG | DIASTOLIC BLOOD PRESSURE: 45 MMHG

## 2020-05-17 VITALS — SYSTOLIC BLOOD PRESSURE: 119 MMHG | DIASTOLIC BLOOD PRESSURE: 32 MMHG

## 2020-05-17 VITALS — SYSTOLIC BLOOD PRESSURE: 109 MMHG | DIASTOLIC BLOOD PRESSURE: 34 MMHG

## 2020-05-17 VITALS — SYSTOLIC BLOOD PRESSURE: 93 MMHG | DIASTOLIC BLOOD PRESSURE: 41 MMHG

## 2020-05-17 VITALS — SYSTOLIC BLOOD PRESSURE: 93 MMHG | DIASTOLIC BLOOD PRESSURE: 31 MMHG

## 2020-05-17 VITALS — DIASTOLIC BLOOD PRESSURE: 41 MMHG | SYSTOLIC BLOOD PRESSURE: 92 MMHG

## 2020-05-17 VITALS — DIASTOLIC BLOOD PRESSURE: 40 MMHG | SYSTOLIC BLOOD PRESSURE: 106 MMHG

## 2020-05-17 VITALS — SYSTOLIC BLOOD PRESSURE: 96 MMHG | DIASTOLIC BLOOD PRESSURE: 34 MMHG

## 2020-05-17 VITALS — DIASTOLIC BLOOD PRESSURE: 40 MMHG | SYSTOLIC BLOOD PRESSURE: 109 MMHG

## 2020-05-17 VITALS — DIASTOLIC BLOOD PRESSURE: 37 MMHG | SYSTOLIC BLOOD PRESSURE: 104 MMHG

## 2020-05-17 VITALS — SYSTOLIC BLOOD PRESSURE: 111 MMHG | DIASTOLIC BLOOD PRESSURE: 53 MMHG

## 2020-05-17 VITALS — SYSTOLIC BLOOD PRESSURE: 89 MMHG | DIASTOLIC BLOOD PRESSURE: 39 MMHG

## 2020-05-17 VITALS — DIASTOLIC BLOOD PRESSURE: 29 MMHG | SYSTOLIC BLOOD PRESSURE: 92 MMHG

## 2020-05-17 VITALS — DIASTOLIC BLOOD PRESSURE: 39 MMHG | SYSTOLIC BLOOD PRESSURE: 106 MMHG

## 2020-05-17 VITALS — DIASTOLIC BLOOD PRESSURE: 39 MMHG | SYSTOLIC BLOOD PRESSURE: 108 MMHG

## 2020-05-17 VITALS — DIASTOLIC BLOOD PRESSURE: 34 MMHG | SYSTOLIC BLOOD PRESSURE: 98 MMHG

## 2020-05-17 VITALS — DIASTOLIC BLOOD PRESSURE: 48 MMHG | SYSTOLIC BLOOD PRESSURE: 101 MMHG

## 2020-05-17 VITALS — DIASTOLIC BLOOD PRESSURE: 39 MMHG | SYSTOLIC BLOOD PRESSURE: 105 MMHG

## 2020-05-17 VITALS — DIASTOLIC BLOOD PRESSURE: 37 MMHG | SYSTOLIC BLOOD PRESSURE: 88 MMHG

## 2020-05-17 VITALS — DIASTOLIC BLOOD PRESSURE: 35 MMHG | SYSTOLIC BLOOD PRESSURE: 96 MMHG

## 2020-05-17 VITALS — SYSTOLIC BLOOD PRESSURE: 114 MMHG | DIASTOLIC BLOOD PRESSURE: 51 MMHG

## 2020-05-17 VITALS — SYSTOLIC BLOOD PRESSURE: 106 MMHG | DIASTOLIC BLOOD PRESSURE: 37 MMHG

## 2020-05-17 VITALS — SYSTOLIC BLOOD PRESSURE: 87 MMHG | DIASTOLIC BLOOD PRESSURE: 40 MMHG

## 2020-05-17 VITALS — SYSTOLIC BLOOD PRESSURE: 106 MMHG | DIASTOLIC BLOOD PRESSURE: 38 MMHG

## 2020-05-17 VITALS — DIASTOLIC BLOOD PRESSURE: 38 MMHG | SYSTOLIC BLOOD PRESSURE: 97 MMHG

## 2020-05-17 VITALS — DIASTOLIC BLOOD PRESSURE: 37 MMHG | SYSTOLIC BLOOD PRESSURE: 96 MMHG

## 2020-05-17 VITALS — DIASTOLIC BLOOD PRESSURE: 37 MMHG | SYSTOLIC BLOOD PRESSURE: 82 MMHG

## 2020-05-17 VITALS — SYSTOLIC BLOOD PRESSURE: 115 MMHG | DIASTOLIC BLOOD PRESSURE: 44 MMHG

## 2020-05-17 VITALS — DIASTOLIC BLOOD PRESSURE: 35 MMHG | SYSTOLIC BLOOD PRESSURE: 99 MMHG

## 2020-05-17 VITALS — SYSTOLIC BLOOD PRESSURE: 93 MMHG | DIASTOLIC BLOOD PRESSURE: 35 MMHG

## 2020-05-17 VITALS — DIASTOLIC BLOOD PRESSURE: 32 MMHG | SYSTOLIC BLOOD PRESSURE: 103 MMHG

## 2020-05-17 VITALS — SYSTOLIC BLOOD PRESSURE: 108 MMHG | DIASTOLIC BLOOD PRESSURE: 36 MMHG

## 2020-05-17 VITALS — DIASTOLIC BLOOD PRESSURE: 37 MMHG | SYSTOLIC BLOOD PRESSURE: 105 MMHG

## 2020-05-17 VITALS — DIASTOLIC BLOOD PRESSURE: 32 MMHG | SYSTOLIC BLOOD PRESSURE: 106 MMHG

## 2020-05-17 VITALS — SYSTOLIC BLOOD PRESSURE: 88 MMHG | DIASTOLIC BLOOD PRESSURE: 34 MMHG

## 2020-05-17 VITALS — DIASTOLIC BLOOD PRESSURE: 54 MMHG | SYSTOLIC BLOOD PRESSURE: 121 MMHG

## 2020-05-17 VITALS — SYSTOLIC BLOOD PRESSURE: 92 MMHG | DIASTOLIC BLOOD PRESSURE: 34 MMHG

## 2020-05-17 VITALS — SYSTOLIC BLOOD PRESSURE: 107 MMHG | DIASTOLIC BLOOD PRESSURE: 43 MMHG

## 2020-05-17 VITALS — SYSTOLIC BLOOD PRESSURE: 98 MMHG | DIASTOLIC BLOOD PRESSURE: 36 MMHG

## 2020-05-17 VITALS — SYSTOLIC BLOOD PRESSURE: 94 MMHG | DIASTOLIC BLOOD PRESSURE: 31 MMHG

## 2020-05-17 VITALS — SYSTOLIC BLOOD PRESSURE: 90 MMHG | DIASTOLIC BLOOD PRESSURE: 33 MMHG

## 2020-05-17 VITALS — DIASTOLIC BLOOD PRESSURE: 34 MMHG | SYSTOLIC BLOOD PRESSURE: 105 MMHG

## 2020-05-17 VITALS — SYSTOLIC BLOOD PRESSURE: 91 MMHG | DIASTOLIC BLOOD PRESSURE: 28 MMHG

## 2020-05-17 VITALS — SYSTOLIC BLOOD PRESSURE: 88 MMHG | DIASTOLIC BLOOD PRESSURE: 55 MMHG

## 2020-05-17 VITALS — DIASTOLIC BLOOD PRESSURE: 33 MMHG | SYSTOLIC BLOOD PRESSURE: 89 MMHG

## 2020-05-17 VITALS — DIASTOLIC BLOOD PRESSURE: 34 MMHG | SYSTOLIC BLOOD PRESSURE: 100 MMHG

## 2020-05-17 VITALS — SYSTOLIC BLOOD PRESSURE: 85 MMHG | DIASTOLIC BLOOD PRESSURE: 43 MMHG

## 2020-05-17 LAB
ALBUMIN SERPL-MCNC: 1.5 G/DL (ref 3.4–5)
ALP SERPL-CCNC: 138 U/L (ref 46–116)
ALT SERPL-CCNC: 118 U/L (ref 30–65)
ANION GAP SERPL CALC-SCNC: 8 MMOL/L (ref 7–16)
AST SERPL-CCNC: 342 U/L (ref 15–37)
BE: -6.1 MMOL/L
BILIRUB DIRECT SERPL-MCNC: 2.6 MG/DL
BILIRUB SERPL-MCNC: 3.4 MG/DL
BUN SERPL-MCNC: 51 MG/DL (ref 7–18)
CALCIUM SERPL-MCNC: 7.7 MG/DL (ref 8.5–10.1)
CHLORIDE SERPL-SCNC: 108 MMOL/L (ref 98–107)
CO2 SERPL-SCNC: 22 MMOL/L (ref 21–32)
CREAT SERPL-MCNC: 2.1 MG/DL (ref 0.6–1.3)
GLUCOSE SERPL-MCNC: 88 MG/DL (ref 70–99)
HCT VFR BLD CALC: 47.6 % (ref 42–52)
HGB BLD-MCNC: 16.1 GM/DL (ref 14–18)
MAGNESIUM SERPL-MCNC: 2 MG/DL (ref 1.8–2.4)
MCH RBC QN AUTO: 29.1 PG (ref 26–34)
MCHC RBC AUTO-ENTMCNC: 33.8 G/DL (ref 28–37)
MCV RBC: 86.1 FL (ref 80–100)
MPV: 10.1 FL. (ref 7.2–11.1)
PCO2 BLD: 28.4 MMHG (ref 35–45)
PLATELET COUNT*: 70 THOU/UL (ref 150–400)
PO2 BLD: 103 MMHG (ref 75–100)
POTASSIUM SERPL-SCNC: 3.3 MMOL/L (ref 3.5–5.1)
PROT SERPL-MCNC: 4.5 G/DL (ref 6.4–8.2)
RBC # BLD AUTO: 5.53 MIL/UL (ref 4.5–6)
RDW-CV: 16 % (ref 10.5–14.5)
SODIUM SERPL-SCNC: 138 MMOL/L (ref 136–145)
WBC # BLD AUTO: 43.4 THOU/UL (ref 4–11)

## 2020-05-17 NOTE — EKG
[place] [address]
Phone:  [phone]                     ELECTROCARDIOGRAM REPORT      
_______________________________________________________________________________
 
Name:         GEOVANNA ROMERO                  Room:          [address]    [place]M [place] 
M.R.#:    [id number]     Account #:     [id number]  
Admission:    20    Attend Phys:   Trina Aranda, 
Discharge:                Date of Birth: [birth year]  
Date of Service: 20 0741  Report #:      [id number]
        [id number]
_______________________________________________________________________________
THIS REPORT FOR:  //name//                      
 
                          [facility]
                                       
Test Date:    2020               Test Time:    07:41:11
Pat Name:     GEOVANNA FUNEZN               Department:   
Patient ID:   [id number]            Room:         [address]
Gender:       M                        Technician:   ROBERTA
:          1955               Requested By: SHARLENE Winn
Order Number: [id number]    Reading MD:   Tonny Winn
                                 Measurements
Intervals                              Axis          
Rate:         108                      P:            
AL:                                    QRS:          5
QRSD:         90                       T:            -57
QT:           328                                    
QTc:          440                                    
                           Interpretive Statements
Atrial fibrillation
Low voltage, extremity leads
Compared to ECG 2020 00:28:10
Myocardial infarct finding no longer present
ST (T wave) deviation no longer present
 
Electronically Signed On 2020 15:35:23 CDT by Tonny Winn
[email or web address]
 
 
 
 
 
 
 
 
 
 
 
 
 
 
 
 
 
 
  <ELECTRONICALLY SIGNED>
                                           By: SHARLENE Winn MD, [facility]    
  20     1535
D: 20 0741   _____________________________________
T: 20 0741   SHARLENE Winn MD, [facility]      /EPI

## 2020-05-18 VITALS — DIASTOLIC BLOOD PRESSURE: 63 MMHG | SYSTOLIC BLOOD PRESSURE: 87 MMHG

## 2020-05-18 VITALS — SYSTOLIC BLOOD PRESSURE: 131 MMHG | DIASTOLIC BLOOD PRESSURE: 35 MMHG

## 2020-05-18 VITALS — DIASTOLIC BLOOD PRESSURE: 39 MMHG | SYSTOLIC BLOOD PRESSURE: 106 MMHG

## 2020-05-18 VITALS — SYSTOLIC BLOOD PRESSURE: 134 MMHG | DIASTOLIC BLOOD PRESSURE: 45 MMHG

## 2020-05-18 VITALS — SYSTOLIC BLOOD PRESSURE: 104 MMHG | DIASTOLIC BLOOD PRESSURE: 40 MMHG

## 2020-05-18 VITALS — SYSTOLIC BLOOD PRESSURE: 99 MMHG | DIASTOLIC BLOOD PRESSURE: 31 MMHG

## 2020-05-18 VITALS — DIASTOLIC BLOOD PRESSURE: 27 MMHG | SYSTOLIC BLOOD PRESSURE: 96 MMHG

## 2020-05-18 VITALS — SYSTOLIC BLOOD PRESSURE: 45 MMHG | DIASTOLIC BLOOD PRESSURE: 25 MMHG

## 2020-05-18 VITALS — DIASTOLIC BLOOD PRESSURE: 45 MMHG | SYSTOLIC BLOOD PRESSURE: 90 MMHG

## 2020-05-18 VITALS — SYSTOLIC BLOOD PRESSURE: 127 MMHG | DIASTOLIC BLOOD PRESSURE: 32 MMHG

## 2020-05-18 VITALS — DIASTOLIC BLOOD PRESSURE: 38 MMHG | SYSTOLIC BLOOD PRESSURE: 131 MMHG

## 2020-05-18 VITALS — SYSTOLIC BLOOD PRESSURE: 111 MMHG | DIASTOLIC BLOOD PRESSURE: 24 MMHG

## 2020-05-18 VITALS — SYSTOLIC BLOOD PRESSURE: 136 MMHG | DIASTOLIC BLOOD PRESSURE: 27 MMHG

## 2020-05-18 VITALS — DIASTOLIC BLOOD PRESSURE: 51 MMHG | SYSTOLIC BLOOD PRESSURE: 83 MMHG

## 2020-05-18 VITALS — SYSTOLIC BLOOD PRESSURE: 95 MMHG | DIASTOLIC BLOOD PRESSURE: 43 MMHG

## 2020-05-18 VITALS — DIASTOLIC BLOOD PRESSURE: 33 MMHG | SYSTOLIC BLOOD PRESSURE: 134 MMHG

## 2020-05-18 VITALS — SYSTOLIC BLOOD PRESSURE: 124 MMHG | DIASTOLIC BLOOD PRESSURE: 34 MMHG

## 2020-05-18 VITALS — SYSTOLIC BLOOD PRESSURE: 76 MMHG | DIASTOLIC BLOOD PRESSURE: 46 MMHG

## 2020-05-18 VITALS — DIASTOLIC BLOOD PRESSURE: 22 MMHG | SYSTOLIC BLOOD PRESSURE: 124 MMHG

## 2020-05-18 VITALS — SYSTOLIC BLOOD PRESSURE: 106 MMHG | DIASTOLIC BLOOD PRESSURE: 79 MMHG

## 2020-05-18 VITALS — SYSTOLIC BLOOD PRESSURE: 103 MMHG | DIASTOLIC BLOOD PRESSURE: 34 MMHG

## 2020-05-18 VITALS — DIASTOLIC BLOOD PRESSURE: 49 MMHG | SYSTOLIC BLOOD PRESSURE: 103 MMHG

## 2020-05-18 VITALS — SYSTOLIC BLOOD PRESSURE: 108 MMHG | DIASTOLIC BLOOD PRESSURE: 41 MMHG

## 2020-05-18 VITALS — DIASTOLIC BLOOD PRESSURE: 26 MMHG | SYSTOLIC BLOOD PRESSURE: 117 MMHG

## 2020-05-18 VITALS — SYSTOLIC BLOOD PRESSURE: 123 MMHG | DIASTOLIC BLOOD PRESSURE: 32 MMHG

## 2020-05-18 VITALS — SYSTOLIC BLOOD PRESSURE: 105 MMHG | DIASTOLIC BLOOD PRESSURE: 28 MMHG

## 2020-05-18 VITALS — SYSTOLIC BLOOD PRESSURE: 93 MMHG | DIASTOLIC BLOOD PRESSURE: 44 MMHG

## 2020-05-18 VITALS — DIASTOLIC BLOOD PRESSURE: 27 MMHG | SYSTOLIC BLOOD PRESSURE: 98 MMHG

## 2020-05-18 VITALS — DIASTOLIC BLOOD PRESSURE: 45 MMHG | SYSTOLIC BLOOD PRESSURE: 105 MMHG

## 2020-05-18 VITALS — SYSTOLIC BLOOD PRESSURE: 92 MMHG | DIASTOLIC BLOOD PRESSURE: 65 MMHG

## 2020-05-18 VITALS — SYSTOLIC BLOOD PRESSURE: 98 MMHG | DIASTOLIC BLOOD PRESSURE: 28 MMHG

## 2020-05-18 VITALS — DIASTOLIC BLOOD PRESSURE: 41 MMHG | SYSTOLIC BLOOD PRESSURE: 122 MMHG

## 2020-05-18 VITALS — DIASTOLIC BLOOD PRESSURE: 46 MMHG | SYSTOLIC BLOOD PRESSURE: 84 MMHG

## 2020-05-18 VITALS — SYSTOLIC BLOOD PRESSURE: 125 MMHG | DIASTOLIC BLOOD PRESSURE: 60 MMHG

## 2020-05-18 VITALS — SYSTOLIC BLOOD PRESSURE: 87 MMHG | DIASTOLIC BLOOD PRESSURE: 61 MMHG

## 2020-05-18 VITALS — DIASTOLIC BLOOD PRESSURE: 61 MMHG | SYSTOLIC BLOOD PRESSURE: 110 MMHG

## 2020-05-18 VITALS — SYSTOLIC BLOOD PRESSURE: 117 MMHG | DIASTOLIC BLOOD PRESSURE: 27 MMHG

## 2020-05-18 VITALS — SYSTOLIC BLOOD PRESSURE: 97 MMHG | DIASTOLIC BLOOD PRESSURE: 38 MMHG

## 2020-05-18 VITALS — DIASTOLIC BLOOD PRESSURE: 31 MMHG | SYSTOLIC BLOOD PRESSURE: 106 MMHG

## 2020-05-18 VITALS — SYSTOLIC BLOOD PRESSURE: 133 MMHG | DIASTOLIC BLOOD PRESSURE: 59 MMHG

## 2020-05-18 VITALS — SYSTOLIC BLOOD PRESSURE: 108 MMHG | DIASTOLIC BLOOD PRESSURE: 38 MMHG

## 2020-05-18 VITALS — DIASTOLIC BLOOD PRESSURE: 46 MMHG | SYSTOLIC BLOOD PRESSURE: 67 MMHG

## 2020-05-18 VITALS — DIASTOLIC BLOOD PRESSURE: 35 MMHG | SYSTOLIC BLOOD PRESSURE: 144 MMHG

## 2020-05-18 VITALS — SYSTOLIC BLOOD PRESSURE: 87 MMHG | DIASTOLIC BLOOD PRESSURE: 27 MMHG

## 2020-05-18 VITALS — SYSTOLIC BLOOD PRESSURE: 134 MMHG | DIASTOLIC BLOOD PRESSURE: 41 MMHG

## 2020-05-18 VITALS — SYSTOLIC BLOOD PRESSURE: 115 MMHG | DIASTOLIC BLOOD PRESSURE: 55 MMHG

## 2020-05-18 VITALS — DIASTOLIC BLOOD PRESSURE: 34 MMHG | SYSTOLIC BLOOD PRESSURE: 96 MMHG

## 2020-05-18 VITALS — SYSTOLIC BLOOD PRESSURE: 106 MMHG | DIASTOLIC BLOOD PRESSURE: 41 MMHG

## 2020-05-18 VITALS — SYSTOLIC BLOOD PRESSURE: 146 MMHG | DIASTOLIC BLOOD PRESSURE: 124 MMHG

## 2020-05-18 VITALS — DIASTOLIC BLOOD PRESSURE: 25 MMHG | SYSTOLIC BLOOD PRESSURE: 107 MMHG

## 2020-05-18 VITALS — DIASTOLIC BLOOD PRESSURE: 30 MMHG | SYSTOLIC BLOOD PRESSURE: 123 MMHG

## 2020-05-18 VITALS — SYSTOLIC BLOOD PRESSURE: 131 MMHG | DIASTOLIC BLOOD PRESSURE: 59 MMHG

## 2020-05-18 VITALS — DIASTOLIC BLOOD PRESSURE: 68 MMHG | SYSTOLIC BLOOD PRESSURE: 86 MMHG

## 2020-05-18 VITALS — DIASTOLIC BLOOD PRESSURE: 34 MMHG | SYSTOLIC BLOOD PRESSURE: 84 MMHG

## 2020-05-18 VITALS — SYSTOLIC BLOOD PRESSURE: 88 MMHG | DIASTOLIC BLOOD PRESSURE: 34 MMHG

## 2020-05-18 VITALS — SYSTOLIC BLOOD PRESSURE: 138 MMHG | DIASTOLIC BLOOD PRESSURE: 43 MMHG

## 2020-05-18 VITALS — SYSTOLIC BLOOD PRESSURE: 123 MMHG | DIASTOLIC BLOOD PRESSURE: 42 MMHG

## 2020-05-18 VITALS — DIASTOLIC BLOOD PRESSURE: 51 MMHG | SYSTOLIC BLOOD PRESSURE: 110 MMHG

## 2020-05-18 VITALS — SYSTOLIC BLOOD PRESSURE: 97 MMHG | DIASTOLIC BLOOD PRESSURE: 32 MMHG

## 2020-05-18 VITALS — SYSTOLIC BLOOD PRESSURE: 92 MMHG | DIASTOLIC BLOOD PRESSURE: 32 MMHG

## 2020-05-18 VITALS — DIASTOLIC BLOOD PRESSURE: 54 MMHG | SYSTOLIC BLOOD PRESSURE: 114 MMHG

## 2020-05-18 VITALS — DIASTOLIC BLOOD PRESSURE: 37 MMHG | SYSTOLIC BLOOD PRESSURE: 116 MMHG

## 2020-05-18 VITALS — SYSTOLIC BLOOD PRESSURE: 130 MMHG | DIASTOLIC BLOOD PRESSURE: 40 MMHG

## 2020-05-18 VITALS — SYSTOLIC BLOOD PRESSURE: 105 MMHG | DIASTOLIC BLOOD PRESSURE: 45 MMHG

## 2020-05-18 VITALS — SYSTOLIC BLOOD PRESSURE: 106 MMHG | DIASTOLIC BLOOD PRESSURE: 42 MMHG

## 2020-05-18 VITALS — SYSTOLIC BLOOD PRESSURE: 127 MMHG | DIASTOLIC BLOOD PRESSURE: 70 MMHG

## 2020-05-18 VITALS — SYSTOLIC BLOOD PRESSURE: 105 MMHG | DIASTOLIC BLOOD PRESSURE: 40 MMHG

## 2020-05-18 VITALS — DIASTOLIC BLOOD PRESSURE: 42 MMHG | SYSTOLIC BLOOD PRESSURE: 102 MMHG

## 2020-05-18 VITALS — SYSTOLIC BLOOD PRESSURE: 91 MMHG | DIASTOLIC BLOOD PRESSURE: 30 MMHG

## 2020-05-18 VITALS — DIASTOLIC BLOOD PRESSURE: 42 MMHG | SYSTOLIC BLOOD PRESSURE: 101 MMHG

## 2020-05-18 VITALS — DIASTOLIC BLOOD PRESSURE: 31 MMHG | SYSTOLIC BLOOD PRESSURE: 53 MMHG

## 2020-05-18 VITALS — DIASTOLIC BLOOD PRESSURE: 46 MMHG | SYSTOLIC BLOOD PRESSURE: 97 MMHG

## 2020-05-18 VITALS — SYSTOLIC BLOOD PRESSURE: 114 MMHG | DIASTOLIC BLOOD PRESSURE: 30 MMHG

## 2020-05-18 VITALS — SYSTOLIC BLOOD PRESSURE: 128 MMHG | DIASTOLIC BLOOD PRESSURE: 41 MMHG

## 2020-05-18 VITALS — SYSTOLIC BLOOD PRESSURE: 98 MMHG | DIASTOLIC BLOOD PRESSURE: 35 MMHG

## 2020-05-18 VITALS — DIASTOLIC BLOOD PRESSURE: 29 MMHG | SYSTOLIC BLOOD PRESSURE: 50 MMHG

## 2020-05-18 VITALS — DIASTOLIC BLOOD PRESSURE: 34 MMHG | SYSTOLIC BLOOD PRESSURE: 79 MMHG

## 2020-05-18 VITALS — SYSTOLIC BLOOD PRESSURE: 109 MMHG | DIASTOLIC BLOOD PRESSURE: 36 MMHG

## 2020-05-18 VITALS — DIASTOLIC BLOOD PRESSURE: 27 MMHG | SYSTOLIC BLOOD PRESSURE: 103 MMHG

## 2020-05-18 VITALS — SYSTOLIC BLOOD PRESSURE: 123 MMHG | DIASTOLIC BLOOD PRESSURE: 54 MMHG

## 2020-05-18 VITALS — DIASTOLIC BLOOD PRESSURE: 44 MMHG | SYSTOLIC BLOOD PRESSURE: 81 MMHG

## 2020-05-18 VITALS — SYSTOLIC BLOOD PRESSURE: 108 MMHG | DIASTOLIC BLOOD PRESSURE: 56 MMHG

## 2020-05-18 VITALS — SYSTOLIC BLOOD PRESSURE: 106 MMHG | DIASTOLIC BLOOD PRESSURE: 43 MMHG

## 2020-05-18 VITALS — SYSTOLIC BLOOD PRESSURE: 98 MMHG | DIASTOLIC BLOOD PRESSURE: 44 MMHG

## 2020-05-18 VITALS — SYSTOLIC BLOOD PRESSURE: 117 MMHG | DIASTOLIC BLOOD PRESSURE: 42 MMHG

## 2020-05-18 VITALS — SYSTOLIC BLOOD PRESSURE: 93 MMHG | DIASTOLIC BLOOD PRESSURE: 43 MMHG

## 2020-05-18 VITALS — DIASTOLIC BLOOD PRESSURE: 34 MMHG | SYSTOLIC BLOOD PRESSURE: 99 MMHG

## 2020-05-18 VITALS — DIASTOLIC BLOOD PRESSURE: 19 MMHG | SYSTOLIC BLOOD PRESSURE: 48 MMHG

## 2020-05-18 VITALS — SYSTOLIC BLOOD PRESSURE: 108 MMHG | DIASTOLIC BLOOD PRESSURE: 62 MMHG

## 2020-05-18 VITALS — SYSTOLIC BLOOD PRESSURE: 114 MMHG | DIASTOLIC BLOOD PRESSURE: 28 MMHG

## 2020-05-18 VITALS — SYSTOLIC BLOOD PRESSURE: 110 MMHG | DIASTOLIC BLOOD PRESSURE: 39 MMHG

## 2020-05-18 VITALS — SYSTOLIC BLOOD PRESSURE: 107 MMHG | DIASTOLIC BLOOD PRESSURE: 38 MMHG

## 2020-05-18 VITALS — DIASTOLIC BLOOD PRESSURE: 38 MMHG | SYSTOLIC BLOOD PRESSURE: 87 MMHG

## 2020-05-18 VITALS — SYSTOLIC BLOOD PRESSURE: 139 MMHG | DIASTOLIC BLOOD PRESSURE: 36 MMHG

## 2020-05-18 VITALS — SYSTOLIC BLOOD PRESSURE: 135 MMHG | DIASTOLIC BLOOD PRESSURE: 36 MMHG

## 2020-05-18 VITALS — DIASTOLIC BLOOD PRESSURE: 59 MMHG | SYSTOLIC BLOOD PRESSURE: 101 MMHG

## 2020-05-18 VITALS — SYSTOLIC BLOOD PRESSURE: 131 MMHG | DIASTOLIC BLOOD PRESSURE: 32 MMHG

## 2020-05-18 VITALS — DIASTOLIC BLOOD PRESSURE: 36 MMHG | SYSTOLIC BLOOD PRESSURE: 117 MMHG

## 2020-05-18 VITALS — DIASTOLIC BLOOD PRESSURE: 37 MMHG | SYSTOLIC BLOOD PRESSURE: 130 MMHG

## 2020-05-18 VITALS — SYSTOLIC BLOOD PRESSURE: 61 MMHG | DIASTOLIC BLOOD PRESSURE: 42 MMHG

## 2020-05-18 VITALS — SYSTOLIC BLOOD PRESSURE: 90 MMHG | DIASTOLIC BLOOD PRESSURE: 34 MMHG

## 2020-05-18 VITALS — SYSTOLIC BLOOD PRESSURE: 139 MMHG | DIASTOLIC BLOOD PRESSURE: 38 MMHG

## 2020-05-18 VITALS — SYSTOLIC BLOOD PRESSURE: 115 MMHG | DIASTOLIC BLOOD PRESSURE: 39 MMHG

## 2020-05-18 VITALS — SYSTOLIC BLOOD PRESSURE: 121 MMHG | DIASTOLIC BLOOD PRESSURE: 50 MMHG

## 2020-05-18 VITALS — DIASTOLIC BLOOD PRESSURE: 41 MMHG | SYSTOLIC BLOOD PRESSURE: 126 MMHG

## 2020-05-18 VITALS — SYSTOLIC BLOOD PRESSURE: 106 MMHG | DIASTOLIC BLOOD PRESSURE: 59 MMHG

## 2020-05-18 LAB
ALBUMIN SERPL-MCNC: 1.5 G/DL (ref 3.4–5)
ALP SERPL-CCNC: 145 U/L (ref 46–116)
ALT SERPL-CCNC: 116 U/L (ref 30–65)
ANION GAP SERPL CALC-SCNC: 13 MMOL/L (ref 7–16)
AST SERPL-CCNC: 263 U/L (ref 15–37)
BE: -10.9 MMOL/L
BILIRUB SERPL-MCNC: 4.2 MG/DL
BUN SERPL-MCNC: 62 MG/DL (ref 7–18)
CALCIUM SERPL-MCNC: 7.6 MG/DL (ref 8.5–10.1)
CHLORIDE SERPL-SCNC: 109 MMOL/L (ref 98–107)
CO2 SERPL-SCNC: 17 MMOL/L (ref 21–32)
CREAT SERPL-MCNC: 2.5 MG/DL (ref 0.6–1.3)
GLUCOSE SERPL-MCNC: 74 MG/DL (ref 70–99)
HCT VFR BLD CALC: 49.8 % (ref 42–52)
HGB BLD-MCNC: 16.7 GM/DL (ref 14–18)
MAGNESIUM SERPL-MCNC: 2 MG/DL (ref 1.8–2.4)
MCH RBC QN AUTO: 29.2 PG (ref 26–34)
MCHC RBC AUTO-ENTMCNC: 33.6 G/DL (ref 28–37)
MCV RBC: 86.9 FL (ref 80–100)
MPV: 10.3 FL. (ref 7.2–11.1)
PCO2 BLD: 25.9 MMHG (ref 35–45)
PLATELET COUNT*: 102 THOU/UL (ref 150–400)
PO2 BLD: 70.8 MMHG (ref 75–100)
POTASSIUM SERPL-SCNC: 3.9 MMOL/L (ref 3.5–5.1)
PROT SERPL-MCNC: 5 G/DL (ref 6.4–8.2)
RBC # BLD AUTO: 5.73 MIL/UL (ref 4.5–6)
RDW-CV: 15.9 % (ref 10.5–14.5)
SODIUM SERPL-SCNC: 139 MMOL/L (ref 136–145)
WBC # BLD AUTO: 42.7 THOU/UL (ref 4–11)

## 2020-05-19 VITALS — DIASTOLIC BLOOD PRESSURE: 31 MMHG | SYSTOLIC BLOOD PRESSURE: 103 MMHG

## 2020-05-19 VITALS — DIASTOLIC BLOOD PRESSURE: 45 MMHG | SYSTOLIC BLOOD PRESSURE: 105 MMHG

## 2020-05-19 VITALS — SYSTOLIC BLOOD PRESSURE: 108 MMHG | DIASTOLIC BLOOD PRESSURE: 47 MMHG

## 2020-05-19 VITALS — SYSTOLIC BLOOD PRESSURE: 123 MMHG | DIASTOLIC BLOOD PRESSURE: 28 MMHG

## 2020-05-19 VITALS — DIASTOLIC BLOOD PRESSURE: 47 MMHG | SYSTOLIC BLOOD PRESSURE: 124 MMHG

## 2020-05-19 VITALS — DIASTOLIC BLOOD PRESSURE: 40 MMHG | SYSTOLIC BLOOD PRESSURE: 107 MMHG

## 2020-05-19 VITALS — SYSTOLIC BLOOD PRESSURE: 111 MMHG | DIASTOLIC BLOOD PRESSURE: 45 MMHG

## 2020-05-19 VITALS — DIASTOLIC BLOOD PRESSURE: 24 MMHG | SYSTOLIC BLOOD PRESSURE: 53 MMHG

## 2020-05-19 VITALS — SYSTOLIC BLOOD PRESSURE: 121 MMHG | DIASTOLIC BLOOD PRESSURE: 22 MMHG

## 2020-05-19 VITALS — SYSTOLIC BLOOD PRESSURE: 144 MMHG | DIASTOLIC BLOOD PRESSURE: 39 MMHG

## 2020-05-19 VITALS — DIASTOLIC BLOOD PRESSURE: 23 MMHG | SYSTOLIC BLOOD PRESSURE: 113 MMHG

## 2020-05-19 VITALS — SYSTOLIC BLOOD PRESSURE: 116 MMHG | DIASTOLIC BLOOD PRESSURE: 23 MMHG

## 2020-05-19 VITALS — SYSTOLIC BLOOD PRESSURE: 107 MMHG | DIASTOLIC BLOOD PRESSURE: 46 MMHG

## 2020-05-19 VITALS — SYSTOLIC BLOOD PRESSURE: 112 MMHG | DIASTOLIC BLOOD PRESSURE: 31 MMHG

## 2020-05-19 VITALS — DIASTOLIC BLOOD PRESSURE: 35 MMHG | SYSTOLIC BLOOD PRESSURE: 100 MMHG

## 2020-05-19 VITALS — SYSTOLIC BLOOD PRESSURE: 139 MMHG | DIASTOLIC BLOOD PRESSURE: 42 MMHG

## 2020-05-19 VITALS — DIASTOLIC BLOOD PRESSURE: 20 MMHG | SYSTOLIC BLOOD PRESSURE: 42 MMHG

## 2020-05-19 VITALS — SYSTOLIC BLOOD PRESSURE: 111 MMHG | DIASTOLIC BLOOD PRESSURE: 32 MMHG

## 2020-05-19 VITALS — DIASTOLIC BLOOD PRESSURE: 46 MMHG | SYSTOLIC BLOOD PRESSURE: 118 MMHG

## 2020-05-19 VITALS — SYSTOLIC BLOOD PRESSURE: 100 MMHG | DIASTOLIC BLOOD PRESSURE: 52 MMHG

## 2020-05-19 VITALS — SYSTOLIC BLOOD PRESSURE: 106 MMHG | DIASTOLIC BLOOD PRESSURE: 24 MMHG

## 2020-05-19 VITALS — SYSTOLIC BLOOD PRESSURE: 111 MMHG | DIASTOLIC BLOOD PRESSURE: 46 MMHG

## 2020-05-19 VITALS — SYSTOLIC BLOOD PRESSURE: 124 MMHG | DIASTOLIC BLOOD PRESSURE: 47 MMHG

## 2020-05-19 VITALS — DIASTOLIC BLOOD PRESSURE: 45 MMHG | SYSTOLIC BLOOD PRESSURE: 80 MMHG

## 2020-05-19 VITALS — SYSTOLIC BLOOD PRESSURE: 122 MMHG | DIASTOLIC BLOOD PRESSURE: 29 MMHG

## 2020-05-19 VITALS — DIASTOLIC BLOOD PRESSURE: 23 MMHG | SYSTOLIC BLOOD PRESSURE: 117 MMHG

## 2020-05-19 VITALS — SYSTOLIC BLOOD PRESSURE: 101 MMHG | DIASTOLIC BLOOD PRESSURE: 36 MMHG

## 2020-05-19 VITALS — SYSTOLIC BLOOD PRESSURE: 115 MMHG | DIASTOLIC BLOOD PRESSURE: 35 MMHG

## 2020-05-19 VITALS — DIASTOLIC BLOOD PRESSURE: 37 MMHG | SYSTOLIC BLOOD PRESSURE: 100 MMHG

## 2020-05-19 VITALS — DIASTOLIC BLOOD PRESSURE: 45 MMHG | SYSTOLIC BLOOD PRESSURE: 106 MMHG

## 2020-05-19 VITALS — DIASTOLIC BLOOD PRESSURE: 27 MMHG | SYSTOLIC BLOOD PRESSURE: 95 MMHG

## 2020-05-19 VITALS — SYSTOLIC BLOOD PRESSURE: 82 MMHG | DIASTOLIC BLOOD PRESSURE: 48 MMHG

## 2020-05-19 VITALS — SYSTOLIC BLOOD PRESSURE: 78 MMHG | DIASTOLIC BLOOD PRESSURE: 44 MMHG

## 2020-05-19 VITALS — SYSTOLIC BLOOD PRESSURE: 74 MMHG | DIASTOLIC BLOOD PRESSURE: 44 MMHG

## 2020-05-19 VITALS — SYSTOLIC BLOOD PRESSURE: 104 MMHG | DIASTOLIC BLOOD PRESSURE: 32 MMHG

## 2020-05-19 VITALS — DIASTOLIC BLOOD PRESSURE: 32 MMHG | SYSTOLIC BLOOD PRESSURE: 102 MMHG

## 2020-05-19 VITALS — DIASTOLIC BLOOD PRESSURE: 37 MMHG | SYSTOLIC BLOOD PRESSURE: 123 MMHG

## 2020-05-19 VITALS — SYSTOLIC BLOOD PRESSURE: 146 MMHG | DIASTOLIC BLOOD PRESSURE: 39 MMHG

## 2020-05-19 VITALS — SYSTOLIC BLOOD PRESSURE: 80 MMHG | DIASTOLIC BLOOD PRESSURE: 67 MMHG

## 2020-05-19 VITALS — DIASTOLIC BLOOD PRESSURE: 28 MMHG | SYSTOLIC BLOOD PRESSURE: 95 MMHG

## 2020-05-19 VITALS — SYSTOLIC BLOOD PRESSURE: 112 MMHG | DIASTOLIC BLOOD PRESSURE: 30 MMHG

## 2020-05-19 VITALS — SYSTOLIC BLOOD PRESSURE: 69 MMHG | DIASTOLIC BLOOD PRESSURE: 46 MMHG

## 2020-05-19 VITALS — DIASTOLIC BLOOD PRESSURE: 43 MMHG | SYSTOLIC BLOOD PRESSURE: 73 MMHG

## 2020-05-19 VITALS — DIASTOLIC BLOOD PRESSURE: 38 MMHG | SYSTOLIC BLOOD PRESSURE: 108 MMHG

## 2020-05-19 VITALS — SYSTOLIC BLOOD PRESSURE: 124 MMHG | DIASTOLIC BLOOD PRESSURE: 48 MMHG

## 2020-05-19 VITALS — DIASTOLIC BLOOD PRESSURE: 44 MMHG | SYSTOLIC BLOOD PRESSURE: 105 MMHG

## 2020-05-19 VITALS — SYSTOLIC BLOOD PRESSURE: 110 MMHG | DIASTOLIC BLOOD PRESSURE: 32 MMHG

## 2020-05-19 VITALS — DIASTOLIC BLOOD PRESSURE: 35 MMHG | SYSTOLIC BLOOD PRESSURE: 111 MMHG

## 2020-05-19 VITALS — DIASTOLIC BLOOD PRESSURE: 31 MMHG | SYSTOLIC BLOOD PRESSURE: 112 MMHG

## 2020-05-19 VITALS — DIASTOLIC BLOOD PRESSURE: 28 MMHG | SYSTOLIC BLOOD PRESSURE: 109 MMHG

## 2020-05-19 VITALS — DIASTOLIC BLOOD PRESSURE: 31 MMHG | SYSTOLIC BLOOD PRESSURE: 117 MMHG

## 2020-05-19 VITALS — SYSTOLIC BLOOD PRESSURE: 122 MMHG | DIASTOLIC BLOOD PRESSURE: 36 MMHG

## 2020-05-19 VITALS — DIASTOLIC BLOOD PRESSURE: 46 MMHG | SYSTOLIC BLOOD PRESSURE: 109 MMHG

## 2020-05-19 VITALS — SYSTOLIC BLOOD PRESSURE: 116 MMHG | DIASTOLIC BLOOD PRESSURE: 49 MMHG

## 2020-05-19 VITALS — DIASTOLIC BLOOD PRESSURE: 47 MMHG | SYSTOLIC BLOOD PRESSURE: 111 MMHG

## 2020-05-19 VITALS — SYSTOLIC BLOOD PRESSURE: 108 MMHG | DIASTOLIC BLOOD PRESSURE: 45 MMHG

## 2020-05-19 VITALS — DIASTOLIC BLOOD PRESSURE: 46 MMHG | SYSTOLIC BLOOD PRESSURE: 105 MMHG

## 2020-05-19 VITALS — SYSTOLIC BLOOD PRESSURE: 115 MMHG | DIASTOLIC BLOOD PRESSURE: 30 MMHG

## 2020-05-19 VITALS — SYSTOLIC BLOOD PRESSURE: 120 MMHG | DIASTOLIC BLOOD PRESSURE: 37 MMHG

## 2020-05-19 VITALS — SYSTOLIC BLOOD PRESSURE: 100 MMHG | DIASTOLIC BLOOD PRESSURE: 25 MMHG

## 2020-05-19 VITALS — DIASTOLIC BLOOD PRESSURE: 37 MMHG | SYSTOLIC BLOOD PRESSURE: 121 MMHG

## 2020-05-19 VITALS — DIASTOLIC BLOOD PRESSURE: 48 MMHG | SYSTOLIC BLOOD PRESSURE: 115 MMHG

## 2020-05-19 VITALS — DIASTOLIC BLOOD PRESSURE: 22 MMHG | SYSTOLIC BLOOD PRESSURE: 115 MMHG

## 2020-05-19 LAB
ALBUMIN SERPL-MCNC: 1.2 G/DL (ref 3.4–5)
ALP SERPL-CCNC: 126 U/L (ref 46–116)
ALT SERPL-CCNC: 87 U/L (ref 30–65)
ANION GAP SERPL CALC-SCNC: 12 MMOL/L (ref 7–16)
AST SERPL-CCNC: 185 U/L (ref 15–37)
BE: -14.1 MMOL/L
BILIRUB SERPL-MCNC: 4.2 MG/DL
BUN SERPL-MCNC: 72 MG/DL (ref 7–18)
CALCIUM SERPL-MCNC: 7.1 MG/DL (ref 8.5–10.1)
CHLORIDE SERPL-SCNC: 112 MMOL/L (ref 98–107)
CO2 SERPL-SCNC: 19 MMOL/L (ref 21–32)
CREAT SERPL-MCNC: 3.2 MG/DL (ref 0.6–1.3)
GLUCOSE SERPL-MCNC: 93 MG/DL (ref 70–99)
HCT VFR BLD CALC: 49.4 % (ref 42–52)
HGB BLD-MCNC: 16.4 GM/DL (ref 14–18)
MAGNESIUM SERPL-MCNC: 2.1 MG/DL (ref 1.8–2.4)
MCH RBC QN AUTO: 29 PG (ref 26–34)
MCHC RBC AUTO-ENTMCNC: 33.1 G/DL (ref 28–37)
MCV RBC: 87.4 FL (ref 80–100)
MPV: 9 FL. (ref 7.2–11.1)
PCO2 BLD: 47 MMHG (ref 35–45)
PLATELET COUNT*: 163 THOU/UL (ref 150–400)
PO2 BLD: 86.1 MMHG (ref 75–100)
POTASSIUM SERPL-SCNC: 4.7 MMOL/L (ref 3.5–5.1)
PROT SERPL-MCNC: 5 G/DL (ref 6.4–8.2)
RBC # BLD AUTO: 5.65 MIL/UL (ref 4.5–6)
RDW-CV: 16.5 % (ref 10.5–14.5)
SODIUM SERPL-SCNC: 143 MMOL/L (ref 136–145)
WBC # BLD AUTO: 43.4 THOU/UL (ref 4–11)

## 2020-05-19 PROCEDURE — 06HY33Z INSERTION OF INFUSION DEVICE INTO LOWER VEIN, PERCUTANEOUS APPROACH: ICD-10-PCS | Performed by: INTERNAL MEDICINE

## 2020-05-19 NOTE — CON
[address]  [address]                    CONSULTATION                  
_______________________________________________________________________________
 
Name:       GEOVANNA ROMERO                   Room:           [address] [place]  
..#:  [id number]      Account #:      [id number]  
Admission:  05/16/20     Attend Phys:    Trina Aranda MD 
Discharge:               Date of Birth:  [birth year]  
         Report #: [id number]
                                                                     [id number]  
_______________________________________________________________________________
THIS REPORT FOR:  //name//                      
 
cc:  Breana Cardoza Sarah Anne FNP                                                  ~
THIS REPORT FOR:  //name//                      
 
CC: Trina Cardoza
 
DATE OF SERVICE:  05/17/2020
 
 
REQUESTING PHYSICIAN:  Dr. Aranda.
 
REASON FOR CONSULTATION:  Acute kidney injury.
 
HISTORY OF PRESENT ILLNESS:  The patient is a 64-year-old man with medical
history significant for alcoholism, chronic chest pain, coronary artery disease.
 He refused a cardiac catheterization in the past, history of atrial
fibrillation.  He presents to the hospital with respiratory failure, diagnosed
with a sepsis.  His blood cultures revealed gram-positive cocci in 6/6 samples. 
His urine cultures also grew gram-positive cocci.  His sputum cultures were
pending.
 
On admission, his white count is elevated at 40,000 and stays elevated.  His
creatinine was 1.7, it is 2.1 now, he is producing urine.  He had 800 mL of
urine.  He had abdominal and pelvic CT that revealed very large left renal
inferior pole mass 13 x 13 x 10 cm presumptive renal cell carcinoma.
 
There is also a left renal interpolar region, suspected wedge-shaped infarction
located along the inferior margin of the mass.
 
SOCIAL HISTORY:  Positive for alcoholism.
 
FAMILY HISTORY:  Noncontributory.
 
REVIEW OF SYSTEMS:  Unobtainable now as he is intubated and sedated.
 
PHYSICAL EXAMINATION:
GENERAL:  Intubated, sedated.
VITAL SIGNS:  Blood pressure 98/36, heart rate 82, afebrile.
HEENT:  Pupils are round.
NECK:  Supple.
LUNGS:  Decreased air movements.
CARDIOVASCULAR:  Irregular rate.
ABDOMEN:  Soft.
 
 
 
[place] [address]                    CONSULTATION                  
_______________________________________________________________________________
 
Name:       ALLI ROMEROER FE                   Room:           [address]#:  [id number]      Account #:      [id number]  
Admission:  05/16/20     Attend Phys:    Trina Aranda MD 
Discharge:               Date of Birth:  [birth year]  
         Report #: [id number]
                                                                     [id number]  
_______________________________________________________________________________
LOWER EXTREMITIES:  No edema.
 
LABORATORY REPORT:  As I mentioned earlier.
 
ASSESSMENT:
1.  Acute kidney injury due to Staph aureus bacteremia with sepsis.
2.  Staph aureus bacteremia and sepsis.
3.  Large left renal mass.
4.  Alcoholism.
5.  Coronary artery disease.
 
PLAN:
1.  Supportive care.
2.  Very poor chronic dialysis candidate.
3.  His left renal mass will be addressed by urologist.  Unfortunately, we do
not have any urological coverage here as far as I know.
 
 
 
 
 
 
 
 
 
 
 
 
 
 
 
 
 
 
 
 
 
 
 
 
 
 
 
 
<ELECTRONICALLY SIGNED>
                                        By:  Lonny Newman MD      
05/19/20     1135
D: 05/17/20 1036_______________________________________
T: 05/17/20 1735AMD TYLER Jasso

## 2020-05-20 VITALS — DIASTOLIC BLOOD PRESSURE: 39 MMHG | SYSTOLIC BLOOD PRESSURE: 116 MMHG

## 2020-05-20 VITALS — SYSTOLIC BLOOD PRESSURE: 112 MMHG | DIASTOLIC BLOOD PRESSURE: 34 MMHG

## 2020-05-20 VITALS — SYSTOLIC BLOOD PRESSURE: 92 MMHG | DIASTOLIC BLOOD PRESSURE: 25 MMHG

## 2020-05-20 VITALS — SYSTOLIC BLOOD PRESSURE: 111 MMHG | DIASTOLIC BLOOD PRESSURE: 30 MMHG

## 2020-05-20 VITALS — DIASTOLIC BLOOD PRESSURE: 33 MMHG | SYSTOLIC BLOOD PRESSURE: 118 MMHG

## 2020-05-20 VITALS — SYSTOLIC BLOOD PRESSURE: 98 MMHG | DIASTOLIC BLOOD PRESSURE: 34 MMHG

## 2020-05-20 VITALS — DIASTOLIC BLOOD PRESSURE: 34 MMHG | SYSTOLIC BLOOD PRESSURE: 120 MMHG

## 2020-05-20 VITALS — DIASTOLIC BLOOD PRESSURE: 36 MMHG | SYSTOLIC BLOOD PRESSURE: 118 MMHG

## 2020-05-20 VITALS — DIASTOLIC BLOOD PRESSURE: 70 MMHG | SYSTOLIC BLOOD PRESSURE: 92 MMHG

## 2020-05-20 VITALS — DIASTOLIC BLOOD PRESSURE: 34 MMHG | SYSTOLIC BLOOD PRESSURE: 102 MMHG

## 2020-05-20 VITALS — DIASTOLIC BLOOD PRESSURE: 61 MMHG | SYSTOLIC BLOOD PRESSURE: 94 MMHG

## 2020-05-20 VITALS — SYSTOLIC BLOOD PRESSURE: 96 MMHG | DIASTOLIC BLOOD PRESSURE: 64 MMHG

## 2020-05-20 VITALS — DIASTOLIC BLOOD PRESSURE: 35 MMHG | SYSTOLIC BLOOD PRESSURE: 100 MMHG

## 2020-05-20 VITALS — SYSTOLIC BLOOD PRESSURE: 97 MMHG | DIASTOLIC BLOOD PRESSURE: 25 MMHG

## 2020-05-20 VITALS — DIASTOLIC BLOOD PRESSURE: 37 MMHG | SYSTOLIC BLOOD PRESSURE: 108 MMHG

## 2020-05-20 VITALS — SYSTOLIC BLOOD PRESSURE: 95 MMHG | DIASTOLIC BLOOD PRESSURE: 28 MMHG

## 2020-05-20 VITALS — SYSTOLIC BLOOD PRESSURE: 89 MMHG | DIASTOLIC BLOOD PRESSURE: 64 MMHG

## 2020-05-20 VITALS — DIASTOLIC BLOOD PRESSURE: 46 MMHG | SYSTOLIC BLOOD PRESSURE: 84 MMHG

## 2020-05-20 VITALS — SYSTOLIC BLOOD PRESSURE: 108 MMHG | DIASTOLIC BLOOD PRESSURE: 32 MMHG

## 2020-05-20 VITALS — DIASTOLIC BLOOD PRESSURE: 45 MMHG | SYSTOLIC BLOOD PRESSURE: 107 MMHG

## 2020-05-20 VITALS — DIASTOLIC BLOOD PRESSURE: 34 MMHG | SYSTOLIC BLOOD PRESSURE: 106 MMHG

## 2020-05-20 VITALS — SYSTOLIC BLOOD PRESSURE: 106 MMHG | DIASTOLIC BLOOD PRESSURE: 35 MMHG

## 2020-05-20 VITALS — SYSTOLIC BLOOD PRESSURE: 102 MMHG | DIASTOLIC BLOOD PRESSURE: 36 MMHG

## 2020-05-20 VITALS — SYSTOLIC BLOOD PRESSURE: 94 MMHG | DIASTOLIC BLOOD PRESSURE: 22 MMHG

## 2020-05-20 VITALS — DIASTOLIC BLOOD PRESSURE: 36 MMHG | SYSTOLIC BLOOD PRESSURE: 121 MMHG

## 2020-05-20 VITALS — SYSTOLIC BLOOD PRESSURE: 90 MMHG | DIASTOLIC BLOOD PRESSURE: 34 MMHG

## 2020-05-20 VITALS — SYSTOLIC BLOOD PRESSURE: 124 MMHG | DIASTOLIC BLOOD PRESSURE: 47 MMHG

## 2020-05-20 VITALS — SYSTOLIC BLOOD PRESSURE: 103 MMHG | DIASTOLIC BLOOD PRESSURE: 39 MMHG

## 2020-05-20 VITALS — SYSTOLIC BLOOD PRESSURE: 106 MMHG | DIASTOLIC BLOOD PRESSURE: 40 MMHG

## 2020-05-20 VITALS — DIASTOLIC BLOOD PRESSURE: 42 MMHG | SYSTOLIC BLOOD PRESSURE: 102 MMHG

## 2020-05-20 VITALS — DIASTOLIC BLOOD PRESSURE: 23 MMHG | SYSTOLIC BLOOD PRESSURE: 83 MMHG

## 2020-05-20 VITALS — DIASTOLIC BLOOD PRESSURE: 42 MMHG | SYSTOLIC BLOOD PRESSURE: 98 MMHG

## 2020-05-20 VITALS — DIASTOLIC BLOOD PRESSURE: 35 MMHG | SYSTOLIC BLOOD PRESSURE: 114 MMHG

## 2020-05-20 VITALS — DIASTOLIC BLOOD PRESSURE: 23 MMHG | SYSTOLIC BLOOD PRESSURE: 103 MMHG

## 2020-05-20 VITALS — DIASTOLIC BLOOD PRESSURE: 28 MMHG | SYSTOLIC BLOOD PRESSURE: 100 MMHG

## 2020-05-20 VITALS — SYSTOLIC BLOOD PRESSURE: 100 MMHG | DIASTOLIC BLOOD PRESSURE: 34 MMHG

## 2020-05-20 VITALS — SYSTOLIC BLOOD PRESSURE: 111 MMHG | DIASTOLIC BLOOD PRESSURE: 35 MMHG

## 2020-05-20 VITALS — DIASTOLIC BLOOD PRESSURE: 33 MMHG | SYSTOLIC BLOOD PRESSURE: 123 MMHG

## 2020-05-20 VITALS — DIASTOLIC BLOOD PRESSURE: 34 MMHG | SYSTOLIC BLOOD PRESSURE: 123 MMHG

## 2020-05-20 VITALS — SYSTOLIC BLOOD PRESSURE: 96 MMHG | DIASTOLIC BLOOD PRESSURE: 59 MMHG

## 2020-05-20 VITALS — SYSTOLIC BLOOD PRESSURE: 119 MMHG | DIASTOLIC BLOOD PRESSURE: 36 MMHG

## 2020-05-20 VITALS — DIASTOLIC BLOOD PRESSURE: 36 MMHG | SYSTOLIC BLOOD PRESSURE: 109 MMHG

## 2020-05-20 VITALS — DIASTOLIC BLOOD PRESSURE: 32 MMHG | SYSTOLIC BLOOD PRESSURE: 104 MMHG

## 2020-05-20 VITALS — DIASTOLIC BLOOD PRESSURE: 75 MMHG | SYSTOLIC BLOOD PRESSURE: 85 MMHG

## 2020-05-20 VITALS — DIASTOLIC BLOOD PRESSURE: 43 MMHG | SYSTOLIC BLOOD PRESSURE: 129 MMHG

## 2020-05-20 VITALS — DIASTOLIC BLOOD PRESSURE: 44 MMHG | SYSTOLIC BLOOD PRESSURE: 105 MMHG

## 2020-05-20 VITALS — DIASTOLIC BLOOD PRESSURE: 26 MMHG | SYSTOLIC BLOOD PRESSURE: 104 MMHG

## 2020-05-20 VITALS — SYSTOLIC BLOOD PRESSURE: 106 MMHG | DIASTOLIC BLOOD PRESSURE: 29 MMHG

## 2020-05-20 VITALS — DIASTOLIC BLOOD PRESSURE: 27 MMHG | SYSTOLIC BLOOD PRESSURE: 97 MMHG

## 2020-05-20 VITALS — SYSTOLIC BLOOD PRESSURE: 108 MMHG | DIASTOLIC BLOOD PRESSURE: 28 MMHG

## 2020-05-20 VITALS — DIASTOLIC BLOOD PRESSURE: 33 MMHG | SYSTOLIC BLOOD PRESSURE: 93 MMHG

## 2020-05-20 VITALS — DIASTOLIC BLOOD PRESSURE: 24 MMHG | SYSTOLIC BLOOD PRESSURE: 104 MMHG

## 2020-05-20 VITALS — SYSTOLIC BLOOD PRESSURE: 101 MMHG | DIASTOLIC BLOOD PRESSURE: 51 MMHG

## 2020-05-20 VITALS — SYSTOLIC BLOOD PRESSURE: 106 MMHG | DIASTOLIC BLOOD PRESSURE: 37 MMHG

## 2020-05-20 VITALS — SYSTOLIC BLOOD PRESSURE: 106 MMHG | DIASTOLIC BLOOD PRESSURE: 27 MMHG

## 2020-05-20 VITALS — SYSTOLIC BLOOD PRESSURE: 108 MMHG | DIASTOLIC BLOOD PRESSURE: 31 MMHG

## 2020-05-20 VITALS — DIASTOLIC BLOOD PRESSURE: 56 MMHG | SYSTOLIC BLOOD PRESSURE: 94 MMHG

## 2020-05-20 VITALS — SYSTOLIC BLOOD PRESSURE: 109 MMHG | DIASTOLIC BLOOD PRESSURE: 50 MMHG

## 2020-05-20 VITALS — DIASTOLIC BLOOD PRESSURE: 63 MMHG | SYSTOLIC BLOOD PRESSURE: 89 MMHG

## 2020-05-20 VITALS — SYSTOLIC BLOOD PRESSURE: 113 MMHG | DIASTOLIC BLOOD PRESSURE: 35 MMHG

## 2020-05-20 VITALS — DIASTOLIC BLOOD PRESSURE: 35 MMHG | SYSTOLIC BLOOD PRESSURE: 118 MMHG

## 2020-05-20 VITALS — SYSTOLIC BLOOD PRESSURE: 99 MMHG | DIASTOLIC BLOOD PRESSURE: 30 MMHG

## 2020-05-20 VITALS — SYSTOLIC BLOOD PRESSURE: 110 MMHG | DIASTOLIC BLOOD PRESSURE: 28 MMHG

## 2020-05-20 VITALS — DIASTOLIC BLOOD PRESSURE: 63 MMHG | SYSTOLIC BLOOD PRESSURE: 90 MMHG

## 2020-05-20 VITALS — SYSTOLIC BLOOD PRESSURE: 102 MMHG | DIASTOLIC BLOOD PRESSURE: 35 MMHG

## 2020-05-20 VITALS — DIASTOLIC BLOOD PRESSURE: 26 MMHG | SYSTOLIC BLOOD PRESSURE: 92 MMHG

## 2020-05-20 VITALS — SYSTOLIC BLOOD PRESSURE: 117 MMHG | DIASTOLIC BLOOD PRESSURE: 31 MMHG

## 2020-05-20 VITALS — SYSTOLIC BLOOD PRESSURE: 103 MMHG | DIASTOLIC BLOOD PRESSURE: 28 MMHG

## 2020-05-20 VITALS — SYSTOLIC BLOOD PRESSURE: 104 MMHG | DIASTOLIC BLOOD PRESSURE: 30 MMHG

## 2020-05-20 VITALS — DIASTOLIC BLOOD PRESSURE: 43 MMHG | SYSTOLIC BLOOD PRESSURE: 101 MMHG

## 2020-05-20 VITALS — DIASTOLIC BLOOD PRESSURE: 48 MMHG | SYSTOLIC BLOOD PRESSURE: 84 MMHG

## 2020-05-20 VITALS — DIASTOLIC BLOOD PRESSURE: 35 MMHG | SYSTOLIC BLOOD PRESSURE: 120 MMHG

## 2020-05-20 VITALS — DIASTOLIC BLOOD PRESSURE: 51 MMHG | SYSTOLIC BLOOD PRESSURE: 86 MMHG

## 2020-05-20 VITALS — DIASTOLIC BLOOD PRESSURE: 25 MMHG | SYSTOLIC BLOOD PRESSURE: 104 MMHG

## 2020-05-20 VITALS — SYSTOLIC BLOOD PRESSURE: 89 MMHG | DIASTOLIC BLOOD PRESSURE: 26 MMHG

## 2020-05-20 VITALS — DIASTOLIC BLOOD PRESSURE: 45 MMHG | SYSTOLIC BLOOD PRESSURE: 110 MMHG

## 2020-05-20 VITALS — SYSTOLIC BLOOD PRESSURE: 109 MMHG | DIASTOLIC BLOOD PRESSURE: 45 MMHG

## 2020-05-20 VITALS — DIASTOLIC BLOOD PRESSURE: 70 MMHG | SYSTOLIC BLOOD PRESSURE: 95 MMHG

## 2020-05-20 VITALS — SYSTOLIC BLOOD PRESSURE: 104 MMHG | DIASTOLIC BLOOD PRESSURE: 33 MMHG

## 2020-05-20 VITALS — DIASTOLIC BLOOD PRESSURE: 30 MMHG | SYSTOLIC BLOOD PRESSURE: 113 MMHG

## 2020-05-20 LAB
ALBUMIN SERPL-MCNC: 1.6 G/DL (ref 3.4–5)
ALP SERPL-CCNC: 89 U/L (ref 46–116)
ALT SERPL-CCNC: 52 U/L (ref 30–65)
ANION GAP SERPL CALC-SCNC: 12 MMOL/L (ref 7–16)
AST SERPL-CCNC: 124 U/L (ref 15–37)
BE: -11.9 MMOL/L
BILIRUB SERPL-MCNC: 10.3 MG/DL
BUN SERPL-MCNC: 80 MG/DL (ref 7–18)
CALCIUM SERPL-MCNC: 7.1 MG/DL (ref 8.5–10.1)
CHLORIDE SERPL-SCNC: 112 MMOL/L (ref 98–107)
CO2 SERPL-SCNC: 19 MMOL/L (ref 21–32)
CREAT SERPL-MCNC: 3.2 MG/DL (ref 0.6–1.3)
GLUCOSE SERPL-MCNC: 101 MG/DL (ref 70–99)
HCT VFR BLD CALC: 42.7 % (ref 42–52)
HGB BLD-MCNC: 14.3 GM/DL (ref 14–18)
MAGNESIUM SERPL-MCNC: 2.3 MG/DL (ref 1.8–2.4)
MCH RBC QN AUTO: 29 PG (ref 26–34)
MCHC RBC AUTO-ENTMCNC: 33.4 G/DL (ref 28–37)
MCV RBC: 87 FL (ref 80–100)
MPV: 8.4 FL. (ref 7.2–11.1)
PCO2 BLD: 33.1 MMHG (ref 35–45)
PLATELET COUNT*: 134 THOU/UL (ref 150–400)
PO2 BLD: 92 MMHG (ref 75–100)
POTASSIUM SERPL-SCNC: 4.2 MMOL/L (ref 3.5–5.1)
PROT SERPL-MCNC: 4.8 G/DL (ref 6.4–8.2)
RBC # BLD AUTO: 4.91 MIL/UL (ref 4.5–6)
RDW-CV: 16.3 % (ref 10.5–14.5)
SODIUM SERPL-SCNC: 143 MMOL/L (ref 136–145)
WBC # BLD AUTO: 24.7 THOU/UL (ref 4–11)

## 2020-05-21 VITALS — SYSTOLIC BLOOD PRESSURE: 133 MMHG | DIASTOLIC BLOOD PRESSURE: 35 MMHG

## 2020-05-21 VITALS — DIASTOLIC BLOOD PRESSURE: 35 MMHG | SYSTOLIC BLOOD PRESSURE: 129 MMHG

## 2020-05-21 VITALS — SYSTOLIC BLOOD PRESSURE: 130 MMHG | DIASTOLIC BLOOD PRESSURE: 35 MMHG

## 2020-05-21 VITALS — SYSTOLIC BLOOD PRESSURE: 118 MMHG | DIASTOLIC BLOOD PRESSURE: 31 MMHG

## 2020-05-21 VITALS — DIASTOLIC BLOOD PRESSURE: 41 MMHG | SYSTOLIC BLOOD PRESSURE: 122 MMHG

## 2020-05-21 VITALS — SYSTOLIC BLOOD PRESSURE: 111 MMHG | DIASTOLIC BLOOD PRESSURE: 57 MMHG

## 2020-05-21 VITALS — SYSTOLIC BLOOD PRESSURE: 144 MMHG | DIASTOLIC BLOOD PRESSURE: 46 MMHG

## 2020-05-21 VITALS — SYSTOLIC BLOOD PRESSURE: 122 MMHG | DIASTOLIC BLOOD PRESSURE: 31 MMHG

## 2020-05-21 VITALS — SYSTOLIC BLOOD PRESSURE: 143 MMHG | DIASTOLIC BLOOD PRESSURE: 46 MMHG

## 2020-05-21 VITALS — DIASTOLIC BLOOD PRESSURE: 33 MMHG | SYSTOLIC BLOOD PRESSURE: 116 MMHG

## 2020-05-21 VITALS — SYSTOLIC BLOOD PRESSURE: 128 MMHG | DIASTOLIC BLOOD PRESSURE: 33 MMHG

## 2020-05-21 VITALS — SYSTOLIC BLOOD PRESSURE: 122 MMHG | DIASTOLIC BLOOD PRESSURE: 33 MMHG

## 2020-05-21 VITALS — DIASTOLIC BLOOD PRESSURE: 34 MMHG | SYSTOLIC BLOOD PRESSURE: 112 MMHG

## 2020-05-21 VITALS — DIASTOLIC BLOOD PRESSURE: 37 MMHG | SYSTOLIC BLOOD PRESSURE: 114 MMHG

## 2020-05-21 VITALS — DIASTOLIC BLOOD PRESSURE: 30 MMHG | SYSTOLIC BLOOD PRESSURE: 127 MMHG

## 2020-05-21 VITALS — DIASTOLIC BLOOD PRESSURE: 29 MMHG | SYSTOLIC BLOOD PRESSURE: 118 MMHG

## 2020-05-21 VITALS — SYSTOLIC BLOOD PRESSURE: 123 MMHG | DIASTOLIC BLOOD PRESSURE: 33 MMHG

## 2020-05-21 VITALS — SYSTOLIC BLOOD PRESSURE: 140 MMHG | DIASTOLIC BLOOD PRESSURE: 33 MMHG

## 2020-05-21 VITALS — DIASTOLIC BLOOD PRESSURE: 40 MMHG | SYSTOLIC BLOOD PRESSURE: 124 MMHG

## 2020-05-21 VITALS — DIASTOLIC BLOOD PRESSURE: 79 MMHG | SYSTOLIC BLOOD PRESSURE: 84 MMHG

## 2020-05-21 VITALS — DIASTOLIC BLOOD PRESSURE: 42 MMHG | SYSTOLIC BLOOD PRESSURE: 143 MMHG

## 2020-05-21 VITALS — DIASTOLIC BLOOD PRESSURE: 32 MMHG | SYSTOLIC BLOOD PRESSURE: 124 MMHG

## 2020-05-21 VITALS — SYSTOLIC BLOOD PRESSURE: 117 MMHG | DIASTOLIC BLOOD PRESSURE: 38 MMHG

## 2020-05-21 VITALS — SYSTOLIC BLOOD PRESSURE: 124 MMHG | DIASTOLIC BLOOD PRESSURE: 34 MMHG

## 2020-05-21 VITALS — DIASTOLIC BLOOD PRESSURE: 34 MMHG | SYSTOLIC BLOOD PRESSURE: 126 MMHG

## 2020-05-21 VITALS — SYSTOLIC BLOOD PRESSURE: 150 MMHG | DIASTOLIC BLOOD PRESSURE: 46 MMHG

## 2020-05-21 VITALS — DIASTOLIC BLOOD PRESSURE: 37 MMHG | SYSTOLIC BLOOD PRESSURE: 130 MMHG

## 2020-05-21 VITALS — SYSTOLIC BLOOD PRESSURE: 170 MMHG | DIASTOLIC BLOOD PRESSURE: 50 MMHG

## 2020-05-21 VITALS — DIASTOLIC BLOOD PRESSURE: 48 MMHG | SYSTOLIC BLOOD PRESSURE: 116 MMHG

## 2020-05-21 VITALS — SYSTOLIC BLOOD PRESSURE: 121 MMHG | DIASTOLIC BLOOD PRESSURE: 30 MMHG

## 2020-05-21 VITALS — DIASTOLIC BLOOD PRESSURE: 33 MMHG | SYSTOLIC BLOOD PRESSURE: 119 MMHG

## 2020-05-21 VITALS — SYSTOLIC BLOOD PRESSURE: 114 MMHG | DIASTOLIC BLOOD PRESSURE: 23 MMHG

## 2020-05-21 VITALS — DIASTOLIC BLOOD PRESSURE: 33 MMHG | SYSTOLIC BLOOD PRESSURE: 120 MMHG

## 2020-05-21 VITALS — SYSTOLIC BLOOD PRESSURE: 125 MMHG | DIASTOLIC BLOOD PRESSURE: 37 MMHG

## 2020-05-21 VITALS — DIASTOLIC BLOOD PRESSURE: 28 MMHG | SYSTOLIC BLOOD PRESSURE: 133 MMHG

## 2020-05-21 VITALS — SYSTOLIC BLOOD PRESSURE: 129 MMHG | DIASTOLIC BLOOD PRESSURE: 26 MMHG

## 2020-05-21 VITALS — DIASTOLIC BLOOD PRESSURE: 35 MMHG | SYSTOLIC BLOOD PRESSURE: 116 MMHG

## 2020-05-21 VITALS — DIASTOLIC BLOOD PRESSURE: 39 MMHG | SYSTOLIC BLOOD PRESSURE: 139 MMHG

## 2020-05-21 VITALS — SYSTOLIC BLOOD PRESSURE: 130 MMHG | DIASTOLIC BLOOD PRESSURE: 34 MMHG

## 2020-05-21 VITALS — DIASTOLIC BLOOD PRESSURE: 39 MMHG | SYSTOLIC BLOOD PRESSURE: 122 MMHG

## 2020-05-21 VITALS — DIASTOLIC BLOOD PRESSURE: 32 MMHG | SYSTOLIC BLOOD PRESSURE: 122 MMHG

## 2020-05-21 VITALS — SYSTOLIC BLOOD PRESSURE: 126 MMHG | DIASTOLIC BLOOD PRESSURE: 36 MMHG

## 2020-05-21 VITALS — SYSTOLIC BLOOD PRESSURE: 132 MMHG | DIASTOLIC BLOOD PRESSURE: 28 MMHG

## 2020-05-21 VITALS — DIASTOLIC BLOOD PRESSURE: 30 MMHG | SYSTOLIC BLOOD PRESSURE: 117 MMHG

## 2020-05-21 VITALS — SYSTOLIC BLOOD PRESSURE: 128 MMHG | DIASTOLIC BLOOD PRESSURE: 28 MMHG

## 2020-05-21 VITALS — DIASTOLIC BLOOD PRESSURE: 27 MMHG | SYSTOLIC BLOOD PRESSURE: 138 MMHG

## 2020-05-21 VITALS — DIASTOLIC BLOOD PRESSURE: 43 MMHG | SYSTOLIC BLOOD PRESSURE: 137 MMHG

## 2020-05-21 VITALS — DIASTOLIC BLOOD PRESSURE: 47 MMHG | SYSTOLIC BLOOD PRESSURE: 144 MMHG

## 2020-05-21 VITALS — DIASTOLIC BLOOD PRESSURE: 27 MMHG | SYSTOLIC BLOOD PRESSURE: 121 MMHG

## 2020-05-21 VITALS — DIASTOLIC BLOOD PRESSURE: 34 MMHG | SYSTOLIC BLOOD PRESSURE: 121 MMHG

## 2020-05-21 VITALS — DIASTOLIC BLOOD PRESSURE: 34 MMHG | SYSTOLIC BLOOD PRESSURE: 118 MMHG

## 2020-05-21 VITALS — SYSTOLIC BLOOD PRESSURE: 136 MMHG | DIASTOLIC BLOOD PRESSURE: 47 MMHG

## 2020-05-21 VITALS — SYSTOLIC BLOOD PRESSURE: 109 MMHG | DIASTOLIC BLOOD PRESSURE: 26 MMHG

## 2020-05-21 VITALS — DIASTOLIC BLOOD PRESSURE: 75 MMHG | SYSTOLIC BLOOD PRESSURE: 80 MMHG

## 2020-05-21 LAB
ALBUMIN SERPL-MCNC: 1.9 G/DL (ref 3.4–5)
ALP SERPL-CCNC: 105 U/L (ref 46–116)
ALT SERPL-CCNC: 36 U/L (ref 30–65)
ANION GAP SERPL CALC-SCNC: 13 MMOL/L (ref 7–16)
AST SERPL-CCNC: 93 U/L (ref 15–37)
BE: -14.7 MMOL/L
BILIRUB SERPL-MCNC: 13.2 MG/DL
BUN SERPL-MCNC: 87 MG/DL (ref 7–18)
CALCIUM SERPL-MCNC: 6.9 MG/DL (ref 8.5–10.1)
CHLORIDE SERPL-SCNC: 110 MMOL/L (ref 98–107)
CO2 SERPL-SCNC: 20 MMOL/L (ref 21–32)
CREAT SERPL-MCNC: 3.4 MG/DL (ref 0.6–1.3)
GLUCOSE SERPL-MCNC: 103 MG/DL (ref 70–99)
HCT VFR BLD CALC: 42.8 % (ref 42–52)
HGB BLD-MCNC: 14.2 GM/DL (ref 14–18)
MAGNESIUM SERPL-MCNC: 2.4 MG/DL (ref 1.8–2.4)
MCH RBC QN AUTO: 29.2 PG (ref 26–34)
MCHC RBC AUTO-ENTMCNC: 33.2 G/DL (ref 28–37)
MCV RBC: 87.9 FL (ref 80–100)
MPV: 9.1 FL. (ref 7.2–11.1)
PCO2 BLD: 35.5 MMHG (ref 35–45)
PLATELET COUNT*: 164 THOU/UL (ref 150–400)
PO2 BLD: 76.4 MMHG (ref 75–100)
POTASSIUM SERPL-SCNC: 4.2 MMOL/L (ref 3.5–5.1)
PROT SERPL-MCNC: 5 G/DL (ref 6.4–8.2)
RBC # BLD AUTO: 4.87 MIL/UL (ref 4.5–6)
RDW-CV: 16.6 % (ref 10.5–14.5)
SODIUM SERPL-SCNC: 143 MMOL/L (ref 136–145)
WBC # BLD AUTO: 29.8 THOU/UL (ref 4–11)

## 2020-05-21 NOTE — CON
[address]  [address]                    CONSULTATION                  
_______________________________________________________________________________
 
Name:       GEOVANNA ROMERO                   Room:           [address] [place]  
..#:  [id number]      Account #:      [id number]  
Admission:  05/16/20     Attend Phys:    Trina Aranda MD 
Discharge:               Date of Birth:  [birth year]  
         Report #: [id number]
                                                                     [id number]  
_______________________________________________________________________________
THIS REPORT FOR:  //name//                      
 
cc:  Breana Cardoza Sarah Anne FNP                                                  ~
THIS REPORT FOR:  //name//                      
 
CC: Trina Cardoza NP 
 
DATE OF SERVICE:  05/16/2020
 
 
REQUESTING PHYSICIAN:  Trina Jimenez MD
 
HISTORY OF PRESENT ILLNESS:  This is a 64-year-old male with history of
alcoholism and cirrhosis due to the same who presented to hospital with mental
status changes, respiratory failure and sepsis.  The patient apparently has not
been able to try for the past week and has been getting weaker and weaker.  The
patient was resisting coming to the hospital and her wife brought him in when he
became unresponsive.
 
The patient currently is intubated and on antibiotics for sepsis.
 
PAST MEDICAL HISTORY:  Significant for history of alcoholism, cirrhosis,
prostate cancer, renal mass, history of colectomy, hypertension,
gastroesophageal reflux disease.
 
ALLERGIES:  No known drug allergy.
 
MEDICATIONS:  Please refer to MAR.
 
SOCIAL HISTORY:  The patient lives at home, has been using alcohol and not have
been able to try it for the past week.
 
FAMILY HISTORY:  Noncontributory.
 
PHYSICAL EXAMINATION:
VITAL SIGNS:  Reveals blood pressure of 149/56, pulse 98, temperature 38.2.  The
patient is intubated.
LUNGS:  Breath sounds are audible both lungs bilaterally.
CARDIOVASCULAR:  Regular.
ABDOMEN:  Soft.
 
LABORATORY DATA:  Reveals sodium of 134, potassium 3.2, BUN is 39, creatinine
1.5.  CK-MB is 20.5, CPK is 417.  Troponin is 12.12.  Albumin 1.8.  WBC is 48
 
 
 
[place] [address]                    CONSULTATION                  
_______________________________________________________________________________
 
Name:       GEOVANNA ROMERO                   Room:           [address] [place]  
[facility]#:  [id number]      Account #:      [id number]  
Admission:  05/16/20     Attend Phys:    Trina Aranda MD 
Discharge:               Date of Birth:  [birth year]  
         Report #: [id number]
                                                                     [id number]  
_______________________________________________________________________________
with hemoglobin of 18 and platelets of 64.  AST is 333, ALT 92, alkaline
phosphatase 182 with total bilirubin of 4.4 and lipase of 527.
 
IMAGING:  CT of abdomen and pelvis was obtained.  There is a very large left
renal mass with no evidence of direct invasion.  This mass measures 13.4 x 13.3
x 9.6 cm.  There is evidence of cirrhosis with portal hypertension and
splenomegaly.  There is also left renal inferior pole nonobstructive
nephrolithiasis noted.
 
ASSESSMENT AND PLAN:  The patient with history of alcoholism and cirrhosis due
to the same, who comes in with failure to thrive, mental status changes, sepsis
and respiratory failure.  His transaminases are somewhat above his baseline from
2019, but I believe that this is artificial and is due to rhabdomyolysis as CCK
is elevated and INR not much different than before.  Also, the ratio of AST to
ALT may suggest that the transaminases partly may be coming from muscles.  We
will continue monitoring his LFTs.  I will order GGT.  I will also check AFP in
reference to his cirrhosis.  The patient is very ill with poor prognosis.  We
will continue to follow.
 
 
 
 
 
 
 
 
 
 
 
 
 
 
 
 
 
 
 
 
 
 
 
 
 
 
<ELECTRONICALLY SIGNED>
                                        By:  Jae Gordon MD            
05/21/20     1410
D: 05/16/20 1402_______________________________________
T: 05/16/20 1459Jae Gordon MD               /nt

## 2020-05-21 NOTE — EKG
[place] [address]
Phone:  [phone]                     ELECTROCARDIOGRAM REPORT      
_______________________________________________________________________________
 
Name:         GEOVANNA ROMERO                  Room:          [address] [place] 
M.R.#:    [id number]     Account #:     [id number]  
Admission:    20    Attend Phys:   Trina Aranda, 
Discharge:                Date of Birth: [birth year]  
Date of Service: 20 0253  Report #:      [id number]
        [id number]
_______________________________________________________________________________
THIS REPORT FOR:  //name//                      
 
                          [facility]
                                       
Test Date:    2020               Test Time:    02:53:26
Pat Name:     GEOVANNA FUNEZN               Department:   
Patient ID:   [id number]            Room:         [address]
Gender:       M                        Technician:   MINDY
:          1955               Requested By: Slava Dooley
Order Number: [id number]    Tanmay MD:   Lalo Vaughan
                                 Measurements
Intervals                              Axis          
Rate:         100                      P:            0
ND:           298                      QRS:          17
QRSD:         84                       T:            207
QT:           357                                    
QTc:          461                                    
                           Interpretive Statements
Atrial fibrillation
Premature ventricular contractions
Anterior infarct, old
Borderline ST depression, anterolateral leads
Compared to ECG 2020 07:41:11
Myocardial infarct finding now present
ST (T wave) deviation now present
 
Electronically Signed On 2020 8:09:35 CDT by Lalo Vaughan
[email or web address]
 
 
 
 
 
 
 
 
 
 
 
 
 
 
 
 
  <ELECTRONICALLY SIGNED>
                                           By: Lalo Vaughan MD, FACC   
  20     0809
D: 20 0253   _____________________________________
T: 20   Lalo Vaughan MD, FACC     /EPI

## 2020-05-21 NOTE — EKG
[place] [address]
Phone:  [phone]                     ELECTROCARDIOGRAM REPORT      
_______________________________________________________________________________
 
Name:         GEOVANNA ROMERO                  Room:          [address] [place] 
.R.#:    [id number]     Account #:     [id number]  
Admission:    20    Attend Phys:   Trina Aranda, 
Discharge:                Date of Birth: [birth year]  
Date of Service: 20 1004  Report #:      [id number]
        [id number]
_______________________________________________________________________________
THIS REPORT FOR:  //name//                      
 
                          [facility]
                                       
Test Date:    2020               Test Time:    10:04:26
Pat Name:     GEOVANNA ROMERO               Department:   
Patient ID:   [id number]            Room:         [address]
Gender:       M                        Technician:   JIM
:          1955               Requested By: Slava Dooley
Order Number: [id number]    Reading MD:   Slava Dooley
                                 Measurements
Intervals                              Axis          
Rate:         94                       P:            266
[place]:           218                      QRS:          8
QRSD:         87                       T:            217
QT:           359                                    
QTc:          449                                    
                           Interpretive Statements
Sinus or ectopic atrial rhythm
Prolonged [place] interval
Low voltage, extremity leads
Anteroseptal infarct, old
Nonspecific repol abnormality, diffuse leads
Lead(s) aVF were not used for morphology analysis
Compared to ECG 2020 02:53:26
Ectopic atrial rhythm now present
First degree AV block now present
Atrial fibrillation no longer present
Ventricular premature complex(es) no longer present
Myocardial infarct finding still present
 
Electronically Signed On 2020 16:15:01 CDT by Slava Dooley
[email or web address]
 
 
 
 
 
 
 
 
 
 
 
  <ELECTRONICALLY SIGNED>
                                           By: Slava Dooley MD, [facility]      
  20     1615
D: 20 1004   _____________________________________
T: 20 1004   Slava Dooley MD, [facility]        /EPI

## 2020-05-22 VITALS — SYSTOLIC BLOOD PRESSURE: 111 MMHG | DIASTOLIC BLOOD PRESSURE: 82 MMHG

## 2020-05-22 VITALS — SYSTOLIC BLOOD PRESSURE: 106 MMHG | DIASTOLIC BLOOD PRESSURE: 23 MMHG

## 2020-05-22 VITALS — SYSTOLIC BLOOD PRESSURE: 115 MMHG | DIASTOLIC BLOOD PRESSURE: 91 MMHG

## 2020-05-22 VITALS — DIASTOLIC BLOOD PRESSURE: 29 MMHG | SYSTOLIC BLOOD PRESSURE: 82 MMHG

## 2020-05-22 VITALS — DIASTOLIC BLOOD PRESSURE: 58 MMHG | SYSTOLIC BLOOD PRESSURE: 75 MMHG

## 2020-05-22 VITALS — DIASTOLIC BLOOD PRESSURE: 91 MMHG | SYSTOLIC BLOOD PRESSURE: 113 MMHG

## 2020-05-22 VITALS — SYSTOLIC BLOOD PRESSURE: 238 MMHG | DIASTOLIC BLOOD PRESSURE: 132 MMHG

## 2020-05-22 VITALS — DIASTOLIC BLOOD PRESSURE: 58 MMHG | SYSTOLIC BLOOD PRESSURE: 92 MMHG

## 2020-05-22 VITALS — DIASTOLIC BLOOD PRESSURE: 26 MMHG | SYSTOLIC BLOOD PRESSURE: 83 MMHG

## 2020-05-22 VITALS — DIASTOLIC BLOOD PRESSURE: 141 MMHG | SYSTOLIC BLOOD PRESSURE: 176 MMHG

## 2020-05-22 VITALS — SYSTOLIC BLOOD PRESSURE: 202 MMHG | DIASTOLIC BLOOD PRESSURE: 130 MMHG

## 2020-05-22 VITALS — DIASTOLIC BLOOD PRESSURE: 125 MMHG | SYSTOLIC BLOOD PRESSURE: 221 MMHG

## 2020-05-22 VITALS — SYSTOLIC BLOOD PRESSURE: 116 MMHG | DIASTOLIC BLOOD PRESSURE: 92 MMHG

## 2020-05-22 VITALS — DIASTOLIC BLOOD PRESSURE: 29 MMHG | SYSTOLIC BLOOD PRESSURE: 112 MMHG

## 2020-05-22 VITALS — SYSTOLIC BLOOD PRESSURE: 151 MMHG | DIASTOLIC BLOOD PRESSURE: 104 MMHG

## 2020-05-22 VITALS — SYSTOLIC BLOOD PRESSURE: 67 MMHG | DIASTOLIC BLOOD PRESSURE: 45 MMHG

## 2020-05-22 VITALS — SYSTOLIC BLOOD PRESSURE: 50 MMHG | DIASTOLIC BLOOD PRESSURE: 34 MMHG

## 2020-05-22 VITALS — DIASTOLIC BLOOD PRESSURE: 46 MMHG | SYSTOLIC BLOOD PRESSURE: 62 MMHG

## 2020-05-22 VITALS — DIASTOLIC BLOOD PRESSURE: 41 MMHG | SYSTOLIC BLOOD PRESSURE: 59 MMHG

## 2020-05-22 VITALS — SYSTOLIC BLOOD PRESSURE: 106 MMHG | DIASTOLIC BLOOD PRESSURE: 27 MMHG

## 2020-05-22 VITALS — DIASTOLIC BLOOD PRESSURE: 108 MMHG | SYSTOLIC BLOOD PRESSURE: 176 MMHG

## 2020-05-22 VITALS — SYSTOLIC BLOOD PRESSURE: 108 MMHG | DIASTOLIC BLOOD PRESSURE: 24 MMHG

## 2020-05-22 VITALS — DIASTOLIC BLOOD PRESSURE: 157 MMHG | SYSTOLIC BLOOD PRESSURE: 236 MMHG

## 2020-05-22 VITALS — DIASTOLIC BLOOD PRESSURE: 157 MMHG | SYSTOLIC BLOOD PRESSURE: 223 MMHG

## 2020-05-22 VITALS — SYSTOLIC BLOOD PRESSURE: 178 MMHG | DIASTOLIC BLOOD PRESSURE: 152 MMHG

## 2020-05-22 VITALS — DIASTOLIC BLOOD PRESSURE: 70 MMHG | SYSTOLIC BLOOD PRESSURE: 88 MMHG

## 2020-05-22 VITALS — DIASTOLIC BLOOD PRESSURE: 25 MMHG | SYSTOLIC BLOOD PRESSURE: 47 MMHG

## 2020-05-22 VITALS — SYSTOLIC BLOOD PRESSURE: 91 MMHG | DIASTOLIC BLOOD PRESSURE: 48 MMHG

## 2020-05-22 VITALS — DIASTOLIC BLOOD PRESSURE: 46 MMHG | SYSTOLIC BLOOD PRESSURE: 65 MMHG

## 2020-05-22 VITALS — DIASTOLIC BLOOD PRESSURE: 69 MMHG | SYSTOLIC BLOOD PRESSURE: 98 MMHG

## 2020-05-22 VITALS — SYSTOLIC BLOOD PRESSURE: 171 MMHG | DIASTOLIC BLOOD PRESSURE: 134 MMHG

## 2020-05-22 VITALS — DIASTOLIC BLOOD PRESSURE: 74 MMHG | SYSTOLIC BLOOD PRESSURE: 93 MMHG

## 2020-05-22 VITALS — SYSTOLIC BLOOD PRESSURE: 137 MMHG | DIASTOLIC BLOOD PRESSURE: 82 MMHG

## 2020-05-22 VITALS — DIASTOLIC BLOOD PRESSURE: 38 MMHG | SYSTOLIC BLOOD PRESSURE: 63 MMHG

## 2020-05-22 VITALS — DIASTOLIC BLOOD PRESSURE: 52 MMHG | SYSTOLIC BLOOD PRESSURE: 73 MMHG

## 2020-05-22 VITALS — SYSTOLIC BLOOD PRESSURE: 163 MMHG | DIASTOLIC BLOOD PRESSURE: 125 MMHG

## 2020-05-22 VITALS — DIASTOLIC BLOOD PRESSURE: 21 MMHG | SYSTOLIC BLOOD PRESSURE: 45 MMHG

## 2020-05-22 VITALS — SYSTOLIC BLOOD PRESSURE: 139 MMHG | DIASTOLIC BLOOD PRESSURE: 45 MMHG

## 2020-05-22 VITALS — DIASTOLIC BLOOD PRESSURE: 148 MMHG | SYSTOLIC BLOOD PRESSURE: 188 MMHG

## 2020-05-22 VITALS — DIASTOLIC BLOOD PRESSURE: 110 MMHG | SYSTOLIC BLOOD PRESSURE: 125 MMHG

## 2020-05-22 VITALS — DIASTOLIC BLOOD PRESSURE: 79 MMHG | SYSTOLIC BLOOD PRESSURE: 101 MMHG

## 2020-05-22 VITALS — DIASTOLIC BLOOD PRESSURE: 89 MMHG | SYSTOLIC BLOOD PRESSURE: 105 MMHG

## 2020-05-22 VITALS — SYSTOLIC BLOOD PRESSURE: 150 MMHG | DIASTOLIC BLOOD PRESSURE: 104 MMHG

## 2020-05-22 VITALS — SYSTOLIC BLOOD PRESSURE: 59 MMHG | DIASTOLIC BLOOD PRESSURE: 34 MMHG

## 2020-05-22 VITALS — SYSTOLIC BLOOD PRESSURE: 58 MMHG | DIASTOLIC BLOOD PRESSURE: 38 MMHG

## 2020-05-22 VITALS — SYSTOLIC BLOOD PRESSURE: 133 MMHG | DIASTOLIC BLOOD PRESSURE: 106 MMHG

## 2020-05-22 VITALS — SYSTOLIC BLOOD PRESSURE: 105 MMHG | DIASTOLIC BLOOD PRESSURE: 71 MMHG

## 2020-05-22 VITALS — DIASTOLIC BLOOD PRESSURE: 57 MMHG | SYSTOLIC BLOOD PRESSURE: 88 MMHG

## 2020-05-22 VITALS — DIASTOLIC BLOOD PRESSURE: 80 MMHG | SYSTOLIC BLOOD PRESSURE: 111 MMHG

## 2020-05-22 VITALS — DIASTOLIC BLOOD PRESSURE: 43 MMHG | SYSTOLIC BLOOD PRESSURE: 68 MMHG

## 2020-05-22 VITALS — SYSTOLIC BLOOD PRESSURE: 116 MMHG | DIASTOLIC BLOOD PRESSURE: 32 MMHG

## 2020-05-22 VITALS — SYSTOLIC BLOOD PRESSURE: 82 MMHG | DIASTOLIC BLOOD PRESSURE: 29 MMHG

## 2020-05-22 VITALS — DIASTOLIC BLOOD PRESSURE: 100 MMHG | SYSTOLIC BLOOD PRESSURE: 140 MMHG

## 2020-05-22 VITALS — DIASTOLIC BLOOD PRESSURE: 48 MMHG | SYSTOLIC BLOOD PRESSURE: 141 MMHG

## 2020-05-22 VITALS — SYSTOLIC BLOOD PRESSURE: 72 MMHG | DIASTOLIC BLOOD PRESSURE: 52 MMHG

## 2020-05-22 VITALS — DIASTOLIC BLOOD PRESSURE: 24 MMHG | SYSTOLIC BLOOD PRESSURE: 108 MMHG

## 2020-05-22 VITALS — SYSTOLIC BLOOD PRESSURE: 250 MMHG | DIASTOLIC BLOOD PRESSURE: 197 MMHG

## 2020-05-22 VITALS — DIASTOLIC BLOOD PRESSURE: 163 MMHG | SYSTOLIC BLOOD PRESSURE: 201 MMHG

## 2020-05-22 VITALS — SYSTOLIC BLOOD PRESSURE: 200 MMHG | DIASTOLIC BLOOD PRESSURE: 166 MMHG

## 2020-05-22 VITALS — SYSTOLIC BLOOD PRESSURE: 108 MMHG | DIASTOLIC BLOOD PRESSURE: 21 MMHG

## 2020-05-22 VITALS — DIASTOLIC BLOOD PRESSURE: 25 MMHG | SYSTOLIC BLOOD PRESSURE: 115 MMHG

## 2020-05-22 VITALS — SYSTOLIC BLOOD PRESSURE: 109 MMHG | DIASTOLIC BLOOD PRESSURE: 87 MMHG

## 2020-05-22 VITALS — DIASTOLIC BLOOD PRESSURE: 40 MMHG | SYSTOLIC BLOOD PRESSURE: 83 MMHG

## 2020-05-22 VITALS — SYSTOLIC BLOOD PRESSURE: 111 MMHG | DIASTOLIC BLOOD PRESSURE: 31 MMHG

## 2020-05-22 VITALS — SYSTOLIC BLOOD PRESSURE: 94 MMHG | DIASTOLIC BLOOD PRESSURE: 70 MMHG

## 2020-05-22 VITALS — SYSTOLIC BLOOD PRESSURE: 117 MMHG | DIASTOLIC BLOOD PRESSURE: 92 MMHG

## 2020-05-22 VITALS — SYSTOLIC BLOOD PRESSURE: 235 MMHG | DIASTOLIC BLOOD PRESSURE: 164 MMHG

## 2020-05-22 VITALS — DIASTOLIC BLOOD PRESSURE: 21 MMHG | SYSTOLIC BLOOD PRESSURE: 111 MMHG

## 2020-05-22 VITALS — SYSTOLIC BLOOD PRESSURE: 259 MMHG | DIASTOLIC BLOOD PRESSURE: 205 MMHG

## 2020-05-22 VITALS — DIASTOLIC BLOOD PRESSURE: 45 MMHG | SYSTOLIC BLOOD PRESSURE: 143 MMHG

## 2020-05-22 VITALS — DIASTOLIC BLOOD PRESSURE: 119 MMHG | SYSTOLIC BLOOD PRESSURE: 161 MMHG

## 2020-05-22 VITALS — SYSTOLIC BLOOD PRESSURE: 76 MMHG | DIASTOLIC BLOOD PRESSURE: 52 MMHG

## 2020-05-22 VITALS — DIASTOLIC BLOOD PRESSURE: 28 MMHG | SYSTOLIC BLOOD PRESSURE: 55 MMHG

## 2020-05-22 VITALS — SYSTOLIC BLOOD PRESSURE: 77 MMHG | DIASTOLIC BLOOD PRESSURE: 70 MMHG

## 2020-05-22 VITALS — SYSTOLIC BLOOD PRESSURE: 96 MMHG | DIASTOLIC BLOOD PRESSURE: 78 MMHG

## 2020-05-22 VITALS — SYSTOLIC BLOOD PRESSURE: 185 MMHG | DIASTOLIC BLOOD PRESSURE: 104 MMHG

## 2020-05-22 VITALS — DIASTOLIC BLOOD PRESSURE: 31 MMHG | SYSTOLIC BLOOD PRESSURE: 85 MMHG

## 2020-05-22 VITALS — DIASTOLIC BLOOD PRESSURE: 28 MMHG | SYSTOLIC BLOOD PRESSURE: 118 MMHG

## 2020-05-22 VITALS — SYSTOLIC BLOOD PRESSURE: 79 MMHG | DIASTOLIC BLOOD PRESSURE: 46 MMHG

## 2020-05-22 VITALS — DIASTOLIC BLOOD PRESSURE: 23 MMHG | SYSTOLIC BLOOD PRESSURE: 110 MMHG

## 2020-05-22 VITALS — SYSTOLIC BLOOD PRESSURE: 114 MMHG | DIASTOLIC BLOOD PRESSURE: 32 MMHG

## 2020-05-22 VITALS — SYSTOLIC BLOOD PRESSURE: 134 MMHG | DIASTOLIC BLOOD PRESSURE: 116 MMHG

## 2020-05-22 VITALS — SYSTOLIC BLOOD PRESSURE: 121 MMHG | DIASTOLIC BLOOD PRESSURE: 103 MMHG

## 2020-05-22 VITALS — DIASTOLIC BLOOD PRESSURE: 24 MMHG | SYSTOLIC BLOOD PRESSURE: 107 MMHG

## 2020-05-22 VITALS — DIASTOLIC BLOOD PRESSURE: 26 MMHG | SYSTOLIC BLOOD PRESSURE: 110 MMHG

## 2020-05-22 VITALS — SYSTOLIC BLOOD PRESSURE: 41 MMHG | DIASTOLIC BLOOD PRESSURE: 22 MMHG

## 2020-05-22 VITALS — DIASTOLIC BLOOD PRESSURE: 125 MMHG | SYSTOLIC BLOOD PRESSURE: 150 MMHG

## 2020-05-22 VITALS — DIASTOLIC BLOOD PRESSURE: 142 MMHG | SYSTOLIC BLOOD PRESSURE: 173 MMHG

## 2020-05-22 VITALS — DIASTOLIC BLOOD PRESSURE: 27 MMHG | SYSTOLIC BLOOD PRESSURE: 103 MMHG

## 2020-05-22 VITALS — SYSTOLIC BLOOD PRESSURE: 139 MMHG | DIASTOLIC BLOOD PRESSURE: 99 MMHG

## 2020-05-22 VITALS — DIASTOLIC BLOOD PRESSURE: 53 MMHG | SYSTOLIC BLOOD PRESSURE: 88 MMHG

## 2020-05-22 VITALS — DIASTOLIC BLOOD PRESSURE: 29 MMHG | SYSTOLIC BLOOD PRESSURE: 50 MMHG

## 2020-05-22 VITALS — SYSTOLIC BLOOD PRESSURE: 125 MMHG | DIASTOLIC BLOOD PRESSURE: 110 MMHG

## 2020-05-22 VITALS — SYSTOLIC BLOOD PRESSURE: 140 MMHG | DIASTOLIC BLOOD PRESSURE: 41 MMHG

## 2020-05-22 VITALS — DIASTOLIC BLOOD PRESSURE: 151 MMHG | SYSTOLIC BLOOD PRESSURE: 185 MMHG

## 2020-05-22 VITALS — DIASTOLIC BLOOD PRESSURE: 68 MMHG | SYSTOLIC BLOOD PRESSURE: 86 MMHG

## 2020-05-22 VITALS — SYSTOLIC BLOOD PRESSURE: 78 MMHG | DIASTOLIC BLOOD PRESSURE: 49 MMHG

## 2020-05-22 VITALS — DIASTOLIC BLOOD PRESSURE: 97 MMHG | SYSTOLIC BLOOD PRESSURE: 114 MMHG

## 2020-05-22 VITALS — SYSTOLIC BLOOD PRESSURE: 159 MMHG | DIASTOLIC BLOOD PRESSURE: 123 MMHG

## 2020-05-22 VITALS — DIASTOLIC BLOOD PRESSURE: 29 MMHG | SYSTOLIC BLOOD PRESSURE: 85 MMHG

## 2020-05-22 VITALS — SYSTOLIC BLOOD PRESSURE: 141 MMHG | DIASTOLIC BLOOD PRESSURE: 117 MMHG

## 2020-05-22 VITALS — DIASTOLIC BLOOD PRESSURE: 54 MMHG | SYSTOLIC BLOOD PRESSURE: 79 MMHG

## 2020-05-22 VITALS — SYSTOLIC BLOOD PRESSURE: 63 MMHG | DIASTOLIC BLOOD PRESSURE: 39 MMHG

## 2020-05-22 VITALS — DIASTOLIC BLOOD PRESSURE: 166 MMHG | SYSTOLIC BLOOD PRESSURE: 214 MMHG

## 2020-05-22 VITALS — DIASTOLIC BLOOD PRESSURE: 114 MMHG | SYSTOLIC BLOOD PRESSURE: 135 MMHG

## 2020-05-22 VITALS — DIASTOLIC BLOOD PRESSURE: 117 MMHG | SYSTOLIC BLOOD PRESSURE: 138 MMHG

## 2020-05-22 VITALS — SYSTOLIC BLOOD PRESSURE: 163 MMHG | DIASTOLIC BLOOD PRESSURE: 123 MMHG

## 2020-05-22 VITALS — DIASTOLIC BLOOD PRESSURE: 75 MMHG | SYSTOLIC BLOOD PRESSURE: 97 MMHG

## 2020-05-22 VITALS — SYSTOLIC BLOOD PRESSURE: 110 MMHG | DIASTOLIC BLOOD PRESSURE: 24 MMHG

## 2020-05-22 VITALS — SYSTOLIC BLOOD PRESSURE: 96 MMHG | DIASTOLIC BLOOD PRESSURE: 77 MMHG

## 2020-05-22 VITALS — SYSTOLIC BLOOD PRESSURE: 40 MMHG | DIASTOLIC BLOOD PRESSURE: 21 MMHG

## 2020-05-22 VITALS — SYSTOLIC BLOOD PRESSURE: 194 MMHG | DIASTOLIC BLOOD PRESSURE: 135 MMHG

## 2020-05-22 VITALS — DIASTOLIC BLOOD PRESSURE: 113 MMHG | SYSTOLIC BLOOD PRESSURE: 136 MMHG

## 2020-05-22 VITALS — SYSTOLIC BLOOD PRESSURE: 116 MMHG | DIASTOLIC BLOOD PRESSURE: 26 MMHG

## 2020-05-22 LAB
ALBUMIN SERPL-MCNC: 2 G/DL (ref 3.4–5)
ALP SERPL-CCNC: 113 U/L (ref 46–116)
ALT SERPL-CCNC: 27 U/L (ref 30–65)
ANION GAP SERPL CALC-SCNC: 16 MMOL/L (ref 7–16)
ANISOCYTOSIS BLD QL SMEAR: (no result)
AST SERPL-CCNC: 113 U/L (ref 15–37)
BILIRUB SERPL-MCNC: 13.7 MG/DL
BUN SERPL-MCNC: 91 MG/DL (ref 7–18)
CALCIUM SERPL-MCNC: 6.2 MG/DL (ref 8.5–10.1)
CHLORIDE SERPL-SCNC: 103 MMOL/L (ref 98–107)
CO2 SERPL-SCNC: 16 MMOL/L (ref 21–32)
CREAT SERPL-MCNC: 4.1 MG/DL (ref 0.6–1.3)
GLUCOSE SERPL-MCNC: 207 MG/DL (ref 70–99)
GRANULOCYTES NFR BLD MANUAL: 90 %
HCT VFR BLD CALC: 39.6 % (ref 42–52)
HGB BLD-MCNC: 12.8 GM/DL (ref 14–18)
INR PPP: 1.5
LYMPHOCYTES # BLD: 2.6 THOU/UL (ref 0.8–5.3)
LYMPHOCYTES NFR BLD AUTO: 7 %
MAGNESIUM SERPL-MCNC: 2.3 MG/DL (ref 1.8–2.4)
MCH RBC QN AUTO: 28.7 PG (ref 26–34)
MCHC RBC AUTO-ENTMCNC: 32.3 G/DL (ref 28–37)
MCV RBC: 88.7 FL (ref 80–100)
MPV: 9.2 FL. (ref 7.2–11.1)
NEUTROPHILS # BLD: 34.2 THOU/UL (ref 1.6–8.1)
NEUTS BAND NFR BLD: 3 %
NUCLEATED RBCS: 2 /100WBC
PLATELET # BLD EST: ADEQUATE 10*3/UL
PLATELET COUNT*: 197 THOU/UL (ref 150–400)
POTASSIUM SERPL-SCNC: 5.1 MMOL/L (ref 3.5–5.1)
PROT SERPL-MCNC: 4.9 G/DL (ref 6.4–8.2)
PROTHROMBIN TIME: 15.5 SECONDS (ref 9.2–11.5)
RBC # BLD AUTO: 4.46 MIL/UL (ref 4.5–6)
RDW-CV: 17.2 % (ref 10.5–14.5)
SODIUM SERPL-SCNC: 135 MMOL/L (ref 136–145)
WBC # BLD AUTO: 36.8 THOU/UL (ref 4–11)

## 2020-05-25 NOTE — CON
[address]  [address]                    CONSULTATION                  
_______________________________________________________________________________
 
Name:       GEOVANNA ROMERO                   Room:           [address] [place]  
.R.#:  [id number]      Account #:      [id number]  
Admission:  20     Attend Phys:    Trina Aranda MD 
Discharge:  20     Date of Birth:  [birth year]  
         Report #: [id number]
                                                                     [id number]  
_______________________________________________________________________________
THIS REPORT FOR:  //name//                      
 
cc:  Breana Cardoza Sarah Anne FNP                                                  
THIS REPORT FOR:  //name//                      
 
CC: Trina Cardoza
 
DATE OF SERVICE:  2020
 
 
CARDIOLOGY CONSULTATION
 
HISTORY OF PRESENT ILLNESS:  I was asked by Dr. Trina Aranda to see this
64-year-old white male in Cardiology consultation for evaluation and treatment
of a possible myocardial infarction.  This man is unresponsive and cannot give a
history.  History was obtained from the chart.  He was sick for a week according
to his wife.  He was unresponsive for 2 days before finally coming to the
hospital apparently.  He does have a history of alcoholic liver disease and
alcoholism.  He came to the Emergency Room and was found to have troponin that
was elevated initially.  He apparently had not had chest pain, although it is
not known.  He is poorly responsive at best.  His initial troponin was 8.28 that
was shortly after midnight, one at about 3:45 a.m. was 11.6, and I believe
another one was 12 three or four hours later.  He does have, on his EKG, minimal
but appreciable ST segment elevation in I, a little bit in II, and definitely in
V5 and V6.  These are compatible with an acute ST elevation MI.  He was not
considered a reasonable candidate for the catheterization lab apparently by the
ER as we were not called.  He also has an old anteroseptal MI on his EKG.  We do
not as yet have old EKGs for comparison and we were obtaining them.  There are
Q-waves in V1 and V2.  He does have low voltage in his limb leads and for that
matter fairly low voltage in his chest leads.  He appears to have some degree of
congestive heart failure on his chest x-ray and a CT of the chest.  There is
also an NT-proBNP of 10,588.  Other problems include pneumonia on his chest
x-ray apparently multifocal.  There is a small area of infiltrate that has a
ground glass appearance in the left apical region.  It could be a semi-solid
density.  It is not very large, 1.2 x 1.2 x 1.8 cm.  The radiologist favored
pneumonitis, but malignancy was conceivable.  There is a right basilar
consolidative atelectasis versus infiltration.  He has cirrhosis with portal
hypertension and splenomegaly as well.  He is in atrial fibrillation with a
rapid ventricular response.  His blood pressure has been at the low end of
normal.  He was felt to have pneumonia.  He does have hyponatremia with a sodium
of 129.  He does have lactic acidosis.  His bicarbonate, I believe, was 19 and I
believe his lactate was 7.  He has a history of prostate cancer.  He has a large
renal cell cancer that is 13.4 x 13.3 x 9.6 in the left superior pole.  There is
a possible wedge-shaped infarct in the left renal area.  CT of the head was
 
 
 
[facility] 
[address] [facility] [place]  [address]                    CONSULTATION                  
_______________________________________________________________________________
 
Name:       GEOVANNA ROMERO                   Room:           [address] [place]  
.R.#:  [id number]      Account #:      [id number]  
Admission:  20     Attend Phys:    Trnia Aranda MD 
Discharge:  20     Date of Birth:  [birth year]  
         Report #: [id number]
                                                                     [id number]  
_______________________________________________________________________________
negative.  There was no acute process.  He was felt to be septic.  His white
blood cell count is 48,000 this morning and was 40,000 when he came in.  He also
has chronic polycythemia.  His hemoglobin was 20 and his hematocrit was 59.9. 
He also has thrombocytopenia.  His platelet count was 102,000.  His BNP was
10,800.  He has a history of essential hypertension.  He does have a UTI with a
lot of white cells and bacteria in his urine.  He also has a past history of
prostate cancer.  He has a history of alcoholism and he does have, on his CT,
cirrhosis with portal hypertension.  He has refused treatment for his renal cell
cancer in the past.  He did have a troponin elevation a year ago on admission
here.  It was small though, only 0.4.  He refused a stress test for that.  On
his drug screen, he had marijuana positivity in his urine, but no other
abnormalities.  He has now been intubated.
 
REVIEW OF SYSTEMS:  Unobtainable.
 
SOCIAL HISTORY:  Unobtainable, although from the record he has not been a
smoker.  He has been a drinker.  He is .  He never used smokeless
tobacco.
 
FAMILY HISTORY:  Included heart disease in her mother.  She was in her 50s or
60s.  It is unknown what his father  of or how old he was when he .
 
PAST SURGICAL HISTORY:  He apparently has had a partial colectomy in the past. 
I suppose it is possible that he may have had colon cancer in the past.
 
ALLERGIES:  He has no known allergies.
 
HOME MEDICATIONS:  A year ago when he was seen in our clinic included aspirin 81
mg daily, folic acid 1 mg daily, Protonix 40 mg daily, quinapril 40 mg nightly,
Xifaxan or rifaximin 550 mg daily, Flomax 0.4 mg daily, and p.r.n. tramadol 50
mg q. 6 hours.  Home meds currently were unchanged from the past.
 
PHYSICAL EXAMINATION:
GENERAL:  He presents as a well-developed, well-nourished, disheveled, and
unresponsive white male.  He was unshaven.
VITAL SIGNS:  Blood pressure was 105/60.  His temperature was 38.2.  His pulse
was 110 when I saw him.  His pulse ox was 98.  His respirations were 35.  He was
not on the ventilator at that point, he was on 2 liters of oxygen.
HEENT:  His head was atraumatic.  There were injected conjunctivae.
LUNGS:  Bilateral coarse breath sounds.
HEART:  Revealed a rapid irregular rhythm.  There were no murmurs, rubs,
thrills, heaves, or gallops.  PMI is not displaced.
ABDOMEN:  Soft, flat, and apparently nontender.  There was organomegaly.  The
liver and spleen could be felt minimally.
EXTREMITIES:  Revealed no cyanosis, clubbing, or edema.  He was poorly
responsive.  He did respond to painful stimuli.
 
 
 
[place] [address]                    CONSULTATION                  
_______________________________________________________________________________
 
Name:       GEOVANNA ROMERO                   Room:           [address] [place]  
M.R.#:  [id number]      Account #:      [id number]  
Admission:  20     Attend Phys:    Trina Aranda MD 
Discharge:  20     Date of Birth:  [birth year]  
         Report #: [id number]
                                                                     [id number]  
_______________________________________________________________________________
 
IMPRESSION:
1.  Acute lateral ST elevation myocardial infarction.
2.  Status post anteroseptal myocardial infarction.
3.  Congestive heart failure.
4.  Atrial fibrillation with a rapid ventricular response.
5.  Pneumonia.
6.  Hyponatremia.
7.  Lactic acidosis.
8.  Prostate cancer.
9.  Elevated D-dimer at 17.7.
10.  Possible COVID-19.
11.  Cirrhosis.
12.  Alcoholism.
13.  History of high blood pressure.
14.  Metabolic encephalopathy.
15.  Renal cell cancer.
16.  Polycythemia.
17.  Thrombocytopenia.
 
RECOMMENDATIONS:  He is too ill to go to the catheterization lab.  He is
basically in septic shock.  He needs to be supported at this point.  I would
give him aspirin and heparin at this point.  Try to control his rate with
diltiazem and digoxin.  If we fail then we will have to go to amiodarone.
 
Thank you very much for asking me to see the patient.  If there are any
questions, please feel free to contact me.  Unfortunately, this man's prognosis
is poor.
 
 
 
 
 
 
 
 
 
 
 
 
 
 
 
 
<ELECTRONICALLY SIGNED>
                                        By:  SHARLENE Winn MD, FACC    
20     1229
D: 20 1013_______________________________________
T: 20 1051F. Tonny Winn MD, FACC       /nt